# Patient Record
Sex: FEMALE | Race: ASIAN | Employment: FULL TIME | URBAN - METROPOLITAN AREA
[De-identification: names, ages, dates, MRNs, and addresses within clinical notes are randomized per-mention and may not be internally consistent; named-entity substitution may affect disease eponyms.]

---

## 2018-04-05 ENCOUNTER — OFFICE VISIT (OUTPATIENT)
Dept: FAMILY MEDICINE CLINIC | Facility: CLINIC | Age: 38
End: 2018-04-05

## 2018-04-05 VITALS
HEART RATE: 54 BPM | HEIGHT: 65 IN | SYSTOLIC BLOOD PRESSURE: 121 MMHG | DIASTOLIC BLOOD PRESSURE: 71 MMHG | BODY MASS INDEX: 25.49 KG/M2 | TEMPERATURE: 98 F | WEIGHT: 153 LBS

## 2018-04-05 DIAGNOSIS — Z00.00 WELL ADULT EXAM: Primary | ICD-10-CM

## 2018-04-05 PROCEDURE — 99385 PREV VISIT NEW AGE 18-39: CPT | Performed by: FAMILY MEDICINE

## 2018-04-05 NOTE — PROGRESS NOTES
HPI:   Jean-Paul Li is a 40year old female who presents for a complete physical exam. Symptoms: periods are regular. Wt Readings from Last 6 Encounters:  04/05/18 : 153 lb (69.4 kg)    Body mass index is 25.46 kg/m².      No results found for: Janeth Ba developed, well nourished,in no apparent distress  SKIN: no rashes,no suspicious lesions  HEENT: atraumatic, normocephalic,ears and throat are clear  EYES:PERRLA, EOMI,conjunctiva are clear  NECK: supple,no adenopathy,no bruits  CHEST: no chest tenderness

## 2018-07-21 ENCOUNTER — LAB ENCOUNTER (OUTPATIENT)
Dept: LAB | Age: 38
End: 2018-07-21
Attending: FAMILY MEDICINE
Payer: COMMERCIAL

## 2018-07-21 DIAGNOSIS — Z00.00 WELL ADULT EXAM: ICD-10-CM

## 2018-07-21 LAB
ALT SERPL-CCNC: 25 U/L (ref 14–54)
ANION GAP SERPL CALC-SCNC: 8 MMOL/L (ref 0–18)
AST SERPL-CCNC: 28 U/L (ref 15–41)
BASOPHILS # BLD: 0 K/UL (ref 0–0.2)
BASOPHILS NFR BLD: 1 %
BUN SERPL-MCNC: 6 MG/DL (ref 8–20)
BUN/CREAT SERPL: 8.2 (ref 10–20)
CALCIUM SERPL-MCNC: 9.3 MG/DL (ref 8.5–10.5)
CHLORIDE SERPL-SCNC: 105 MMOL/L (ref 95–110)
CHOLEST SERPL-MCNC: 167 MG/DL (ref 110–200)
CO2 SERPL-SCNC: 24 MMOL/L (ref 22–32)
CREAT SERPL-MCNC: 0.73 MG/DL (ref 0.5–1.5)
EOSINOPHIL # BLD: 0.1 K/UL (ref 0–0.7)
EOSINOPHIL NFR BLD: 1 %
ERYTHROCYTE [DISTWIDTH] IN BLOOD BY AUTOMATED COUNT: 14.4 % (ref 11–15)
GLUCOSE SERPL-MCNC: 91 MG/DL (ref 70–99)
HCT VFR BLD AUTO: 40.5 % (ref 35–48)
HDLC SERPL-MCNC: 47 MG/DL
HGB BLD-MCNC: 13.3 G/DL (ref 12–16)
LDLC SERPL CALC-MCNC: 103 MG/DL (ref 0–99)
LYMPHOCYTES # BLD: 1.4 K/UL (ref 1–4)
LYMPHOCYTES NFR BLD: 24 %
MCH RBC QN AUTO: 26.2 PG (ref 27–32)
MCHC RBC AUTO-ENTMCNC: 32.9 G/DL (ref 32–37)
MCV RBC AUTO: 79.7 FL (ref 80–100)
MONOCYTES # BLD: 0.4 K/UL (ref 0–1)
MONOCYTES NFR BLD: 6 %
NEUTROPHILS # BLD AUTO: 3.8 K/UL (ref 1.8–7.7)
NEUTROPHILS NFR BLD: 67 %
NONHDLC SERPL-MCNC: 120 MG/DL
OSMOLALITY UR CALC.SUM OF ELEC: 281 MOSM/KG (ref 275–295)
PLATELET # BLD AUTO: 214 K/UL (ref 140–400)
PMV BLD AUTO: 9.6 FL (ref 7.4–10.3)
POTASSIUM SERPL-SCNC: 4.2 MMOL/L (ref 3.3–5.1)
RBC # BLD AUTO: 5.09 M/UL (ref 3.7–5.4)
SODIUM SERPL-SCNC: 137 MMOL/L (ref 136–144)
TRIGL SERPL-MCNC: 83 MG/DL (ref 1–149)
TSH SERPL-ACNC: 1.35 UIU/ML (ref 0.45–5.33)
WBC # BLD AUTO: 5.6 K/UL (ref 4–11)

## 2018-07-21 PROCEDURE — 85025 COMPLETE CBC W/AUTO DIFF WBC: CPT

## 2018-07-21 PROCEDURE — 36415 COLL VENOUS BLD VENIPUNCTURE: CPT

## 2018-07-21 PROCEDURE — 80048 BASIC METABOLIC PNL TOTAL CA: CPT

## 2018-07-21 PROCEDURE — 82306 VITAMIN D 25 HYDROXY: CPT

## 2018-07-21 PROCEDURE — 80061 LIPID PANEL: CPT

## 2018-07-21 PROCEDURE — 84450 TRANSFERASE (AST) (SGOT): CPT

## 2018-07-21 PROCEDURE — 84443 ASSAY THYROID STIM HORMONE: CPT

## 2018-07-21 PROCEDURE — 84460 ALANINE AMINO (ALT) (SGPT): CPT

## 2018-07-23 ENCOUNTER — TELEPHONE (OUTPATIENT)
Dept: FAMILY MEDICINE CLINIC | Facility: CLINIC | Age: 38
End: 2018-07-23

## 2018-07-23 LAB — 25(OH)D3 SERPL-MCNC: 24.9 NG/ML

## 2018-07-24 NOTE — TELEPHONE ENCOUNTER
Message routed to provider to confirm the disposition of the lab results dated 7/21/18.      Please reply to pool: ALYX RN Jose Alfredo Du

## 2018-07-25 NOTE — TELEPHONE ENCOUNTER
Written by Theodore Levy MD on 7/24/2018 12:46 PM   Labs all good other than Vitamin D level low, Recommend OTC Vitamin D3 supplementation 1000- 2000 IU daily. Results released through My Chart.

## 2018-08-22 ENCOUNTER — TELEPHONE (OUTPATIENT)
Dept: FAMILY MEDICINE CLINIC | Facility: CLINIC | Age: 38
End: 2018-08-22

## 2018-08-22 NOTE — TELEPHONE ENCOUNTER
Most people tend to be deficient with vitamin D. It is okay to take the vitamin D regularly and this can also be checked in the yearly blood work if needed.

## 2018-08-22 NOTE — TELEPHONE ENCOUNTER
Pt asking if she should continue to take Vitamin D? Pt asking if another lab is needed ? Please advise.

## 2018-08-22 NOTE — TELEPHONE ENCOUNTER
AMA please see pt message below. Looks like it is in regards to this result note copied here. Do you just want us to specify to pt that that dose is meant to be taken for life, and you will just check vitamin D level yearly?     Viewed by Telma Calix on

## 2018-10-18 ENCOUNTER — TELEPHONE (OUTPATIENT)
Dept: FAMILY MEDICINE CLINIC | Facility: CLINIC | Age: 38
End: 2018-10-18

## 2018-10-18 NOTE — TELEPHONE ENCOUNTER
Patient states someone called her to set up flu vaccine appt. I had her on hold and accidentally disconnected call. CSS: please call her back and schedule flu vaccine for a Saturday RN slot.

## 2018-10-18 NOTE — TELEPHONE ENCOUNTER
Called pt offered Saturday appt but per pt it's too early for her, and she can not come in [address], told her to call her insurance and see if she can go to other facilities and she understood.

## 2018-11-08 ENCOUNTER — APPOINTMENT (OUTPATIENT)
Dept: LAB | Age: 38
End: 2018-11-08
Attending: FAMILY MEDICINE
Payer: COMMERCIAL

## 2018-11-08 DIAGNOSIS — E55.9 VITAMIN D DEFICIENCY: ICD-10-CM

## 2018-11-08 DIAGNOSIS — R71.8 MICROCYTOSIS: ICD-10-CM

## 2018-11-08 PROCEDURE — 36415 COLL VENOUS BLD VENIPUNCTURE: CPT

## 2018-11-08 PROCEDURE — 84466 ASSAY OF TRANSFERRIN: CPT

## 2018-11-08 PROCEDURE — 83540 ASSAY OF IRON: CPT

## 2018-11-08 PROCEDURE — 82306 VITAMIN D 25 HYDROXY: CPT

## 2018-11-10 RX ORDER — MELATONIN
325 2 TIMES DAILY WITH MEALS
Qty: 180 TABLET | Refills: 1 | Status: SHIPPED | OUTPATIENT
Start: 2018-11-10 | End: 2019-03-22

## 2019-03-18 ENCOUNTER — PATIENT MESSAGE (OUTPATIENT)
Dept: FAMILY MEDICINE CLINIC | Facility: CLINIC | Age: 39
End: 2019-03-18

## 2019-03-18 DIAGNOSIS — E61.1 IRON DEFICIENCY: Primary | ICD-10-CM

## 2019-03-19 NOTE — TELEPHONE ENCOUNTER
From: Anival Leger  To: Ramonita Davidson MD  Sent: 3/18/2019 3:29 PM CDT  Subject: Prescription Question    Hello,    I have completed my Iron supplements today, which was 3 months course and do I have to get the test done again ?  or what?     And I'm still

## 2019-03-21 ENCOUNTER — LAB ENCOUNTER (OUTPATIENT)
Dept: LAB | Age: 39
End: 2019-03-21
Attending: FAMILY MEDICINE
Payer: COMMERCIAL

## 2019-03-21 DIAGNOSIS — E61.1 IRON DEFICIENCY: ICD-10-CM

## 2019-03-21 LAB
BASOPHILS # BLD AUTO: 0.05 X10(3) UL (ref 0–0.2)
BASOPHILS NFR BLD AUTO: 0.7 %
DEPRECATED RDW RBC AUTO: 41.3 FL (ref 35.1–46.3)
EOSINOPHIL # BLD AUTO: 0.12 X10(3) UL (ref 0–0.7)
EOSINOPHIL NFR BLD AUTO: 1.7 %
ERYTHROCYTE [DISTWIDTH] IN BLOOD BY AUTOMATED COUNT: 12.6 % (ref 11–15)
HCT VFR BLD AUTO: 38.7 % (ref 35–48)
HGB BLD-MCNC: 12.3 G/DL (ref 12–16)
IMM GRANULOCYTES # BLD AUTO: 0.02 X10(3) UL (ref 0–1)
IMM GRANULOCYTES NFR BLD: 0.3 %
IRON SATURATION: 16 % (ref 15–50)
IRON SERPL-MCNC: 71 UG/DL (ref 50–170)
LYMPHOCYTES # BLD AUTO: 1.73 X10(3) UL (ref 1–4)
LYMPHOCYTES NFR BLD AUTO: 24.9 %
MCH RBC QN AUTO: 28.3 PG (ref 26–34)
MCHC RBC AUTO-ENTMCNC: 31.8 G/DL (ref 31–37)
MCV RBC AUTO: 89 FL (ref 80–100)
MONOCYTES # BLD AUTO: 0.48 X10(3) UL (ref 0.1–1)
MONOCYTES NFR BLD AUTO: 6.9 %
NEUTROPHILS # BLD AUTO: 4.54 X10 (3) UL (ref 1.5–7.7)
NEUTROPHILS # BLD AUTO: 4.54 X10(3) UL (ref 1.5–7.7)
NEUTROPHILS NFR BLD AUTO: 65.5 %
PLATELET # BLD AUTO: 244 10(3)UL (ref 150–450)
RBC # BLD AUTO: 4.35 X10(6)UL (ref 3.8–5.3)
TOTAL IRON BINDING CAPACITY: 451 UG/DL (ref 240–450)
TRANSFERRIN SERPL-MCNC: 303 MG/DL (ref 200–360)
WBC # BLD AUTO: 6.9 X10(3) UL (ref 4–11)

## 2019-03-21 PROCEDURE — 85025 COMPLETE CBC W/AUTO DIFF WBC: CPT

## 2019-03-21 PROCEDURE — 84466 ASSAY OF TRANSFERRIN: CPT

## 2019-03-21 PROCEDURE — 36415 COLL VENOUS BLD VENIPUNCTURE: CPT

## 2019-03-21 PROCEDURE — 83540 ASSAY OF IRON: CPT

## 2019-03-22 RX ORDER — MELATONIN
325
Qty: 90 TABLET | Refills: 3 | Status: SHIPPED | OUTPATIENT
Start: 2019-03-22 | End: 2020-05-29

## 2019-04-16 ENCOUNTER — PATIENT MESSAGE (OUTPATIENT)
Dept: FAMILY MEDICINE CLINIC | Facility: CLINIC | Age: 39
End: 2019-04-16

## 2019-04-16 NOTE — TELEPHONE ENCOUNTER
From: Manisha Sanchez  To: Jana Davies MD  Sent: 4/16/2019 10:08 AM CDT  Subject: Non-Urgent Medical Question    Thank you Doctor!

## 2019-05-15 ENCOUNTER — OFFICE VISIT (OUTPATIENT)
Dept: FAMILY MEDICINE CLINIC | Facility: CLINIC | Age: 39
End: 2019-05-15
Payer: COMMERCIAL

## 2019-05-15 VITALS
WEIGHT: 169 LBS | SYSTOLIC BLOOD PRESSURE: 117 MMHG | HEIGHT: 65 IN | HEART RATE: 73 BPM | DIASTOLIC BLOOD PRESSURE: 75 MMHG | BODY MASS INDEX: 28.16 KG/M2 | TEMPERATURE: 98 F

## 2019-05-15 DIAGNOSIS — Z00.00 WELL ADULT EXAM: Primary | ICD-10-CM

## 2019-05-15 DIAGNOSIS — E61.1 IRON DEFICIENCY: ICD-10-CM

## 2019-05-15 PROCEDURE — 99395 PREV VISIT EST AGE 18-39: CPT | Performed by: FAMILY MEDICINE

## 2019-05-15 NOTE — PROGRESS NOTES
HPI:    Patient ID: Gia Rodriguez is a 41year old female. HPI  Patient presents with: Well Adult    Thinks tdap around 2014  Has 1 daughter, 8 yrs.     Single mother      Review of Systems   Constitutional: Negative for activity change, appetite restrepo 2010    had her baby     Social History    Socioeconomic History      Marital status:       Spouse name: Not on file      Number of children: Not on file      Years of education: Not on file      Highest education level: Not on file    Occupationa (VITAMIN D) 1000 units Oral Tab Take by mouth.  Disp:  Rfl:    ferrous sulfate 325 (65 FE) MG Oral Tab EC Take 1 tablet (325 mg total) by mouth daily with breakfast. Disp: 90 tablet Rfl: 3     Allergies:No Known Allergies   PHYSICAL EXAM:   Patient presents PLATELET; Future  - LIPID PANEL; Future  - TSH W REFLEX TO FREE T4; Future  - VITAMIN B12; Future    2. Iron deficiency    - IRON AND TIBC;  Future      Orders Placed This Encounter      ALT(SGPT) [E]      AST (SGOT) [E]      Basic Metabolic Panel (8) [E]

## 2019-06-08 ENCOUNTER — LAB ENCOUNTER (OUTPATIENT)
Dept: LAB | Age: 39
End: 2019-06-08
Attending: FAMILY MEDICINE
Payer: COMMERCIAL

## 2019-06-08 DIAGNOSIS — E61.1 IRON DEFICIENCY: ICD-10-CM

## 2019-06-08 DIAGNOSIS — Z00.00 WELL ADULT EXAM: ICD-10-CM

## 2019-06-08 PROCEDURE — 80061 LIPID PANEL: CPT

## 2019-06-08 PROCEDURE — 85025 COMPLETE CBC W/AUTO DIFF WBC: CPT

## 2019-06-08 PROCEDURE — 80048 BASIC METABOLIC PNL TOTAL CA: CPT

## 2019-06-08 PROCEDURE — 84443 ASSAY THYROID STIM HORMONE: CPT

## 2019-06-08 PROCEDURE — 84450 TRANSFERASE (AST) (SGOT): CPT

## 2019-06-08 PROCEDURE — 82607 VITAMIN B-12: CPT

## 2019-06-08 PROCEDURE — 84460 ALANINE AMINO (ALT) (SGPT): CPT

## 2019-06-08 PROCEDURE — 84466 ASSAY OF TRANSFERRIN: CPT

## 2019-06-08 PROCEDURE — 83540 ASSAY OF IRON: CPT

## 2019-06-08 PROCEDURE — 36415 COLL VENOUS BLD VENIPUNCTURE: CPT

## 2019-09-23 ENCOUNTER — TELEPHONE (OUTPATIENT)
Dept: FAMILY MEDICINE CLINIC | Facility: CLINIC | Age: 39
End: 2019-09-23

## 2019-09-26 ENCOUNTER — PATIENT MESSAGE (OUTPATIENT)
Dept: FAMILY MEDICINE CLINIC | Facility: CLINIC | Age: 39
End: 2019-09-26

## 2019-09-26 NOTE — TELEPHONE ENCOUNTER
Message sent. From: Gina Kitchen  To: Marilu Apodaca MD  Sent: 9/26/2019  9:35 AM CDT  Subject: Referral Request    Hello,    I have called Doctors office and requested a referral for Gynecologist by my Doctor, haven't heard anything.     Regards,

## 2019-09-27 ENCOUNTER — PATIENT MESSAGE (OUTPATIENT)
Dept: FAMILY MEDICINE CLINIC | Facility: CLINIC | Age: 39
End: 2019-09-27

## 2019-09-27 DIAGNOSIS — Z01.419 ENCOUNTER FOR WELL WOMAN EXAM WITH ROUTINE GYNECOLOGICAL EXAM: Primary | ICD-10-CM

## 2019-09-27 NOTE — TELEPHONE ENCOUNTER
From: Elfego Austin  To: Delroy Ge MD  Sent: 9/27/2019 10:20 AM CDT  Subject: Referral Request    This will be my first time to see Gynecologist since I got my insurance, just routine checkup.

## 2019-10-16 ENCOUNTER — PATIENT MESSAGE (OUTPATIENT)
Dept: FAMILY MEDICINE CLINIC | Facility: CLINIC | Age: 39
End: 2019-10-16

## 2019-10-16 NOTE — TELEPHONE ENCOUNTER
From: Harsha Benitze  To: Mackenzie Akbar MD  Sent: 10/16/2019 3:40 PM CDT  Subject: Non-Urgent Medical Question    Thank you Doctor!

## 2019-12-21 ENCOUNTER — OFFICE VISIT (OUTPATIENT)
Dept: OBGYN CLINIC | Facility: CLINIC | Age: 39
End: 2019-12-21
Payer: COMMERCIAL

## 2019-12-21 VITALS
DIASTOLIC BLOOD PRESSURE: 74 MMHG | HEART RATE: 64 BPM | HEIGHT: 64.7 IN | WEIGHT: 167 LBS | SYSTOLIC BLOOD PRESSURE: 123 MMHG | BODY MASS INDEX: 28.16 KG/M2

## 2019-12-21 DIAGNOSIS — Z01.419 ENCOUNTER FOR GYNECOLOGICAL EXAMINATION WITHOUT ABNORMAL FINDING: Primary | ICD-10-CM

## 2019-12-21 DIAGNOSIS — N94.3 PMS (PREMENSTRUAL SYNDROME): ICD-10-CM

## 2019-12-21 PROCEDURE — 99212 OFFICE O/P EST SF 10 MIN: CPT | Performed by: OBSTETRICS & GYNECOLOGY

## 2019-12-21 PROCEDURE — 99385 PREV VISIT NEW AGE 18-39: CPT | Performed by: OBSTETRICS & GYNECOLOGY

## 2019-12-21 NOTE — PROGRESS NOTES
Ward Gonzalez is a 46year old female [id number] Patient's last menstrual period was 11/30/2019. Patient presents with:  Gyn Exam: ANNUAL EXAM  Her cycles are  regular. She c/o mood swings and fatigue with menses.   We discussed tx options including calcium file        Attends Holiness service: Not on file        Active member of club or organization: Not on file        Attends meetings of clubs or organizations: Not on file        Relationship status: Not on file      Intimate partner violence:        Fear developed, well nourished  Head/Face: normocephalic  Neck/Thyroid: thyroid symmetric, no thyromegaly, no nodules, no adenopathy  Lymphatic:no abnormal supraclavicular or axillary adenopathy is noted  Breast: normal without palpable masses, tenderness, asym

## 2020-02-14 ENCOUNTER — PATIENT MESSAGE (OUTPATIENT)
Dept: OBGYN CLINIC | Facility: CLINIC | Age: 40
End: 2020-02-14

## 2020-02-14 DIAGNOSIS — Z12.31 SCREENING MAMMOGRAM, ENCOUNTER FOR: Primary | ICD-10-CM

## 2020-02-14 NOTE — TELEPHONE ENCOUNTER
46year old wanting an order for a mammogram.  Pt denies any breast problems or lumps in breasts. She states that she had one five years ago for routine in [facility]. Sent to [facility] for recs.

## 2020-02-14 NOTE — TELEPHONE ENCOUNTER
From: Manisha Sanchez  To: Mily Youngblood. MD Maarh  Sent: 2/14/2020 12:45 PM CST  Subject: Non-Urgent Medical Question    I Want to make an appointment for Mammogram.  Please send me the availabilities.     Thank you

## 2020-02-16 NOTE — TELEPHONE ENCOUNTER
Ok to order a bilateral screening mammogram for her but please advise her that her insurance may or may not cover it since she is not yet 41 and has no risk factors that I am aware of.

## 2020-05-20 ENCOUNTER — NURSE TRIAGE (OUTPATIENT)
Dept: FAMILY MEDICINE CLINIC | Facility: CLINIC | Age: 40
End: 2020-05-20

## 2020-05-20 NOTE — TELEPHONE ENCOUNTER
----- Message from Alcon Augustine RN sent at 5/20/2020 12:39 PM CDT -----  Regarding: FW: Non-Urgent Medical Question  Contact: [phone]      ----- Message -----  From: Gia Rodriguez  Sent: 5/20/2020  10:08 AM CDT  To: Em Rn Triage  Subject: Non-Urgent

## 2020-05-29 RX ORDER — LIDOCAINE HYDROCHLORIDE 20 MG/ML
SOLUTION ORAL; TOPICAL
Qty: 90 TABLET | Refills: 0 | Status: SHIPPED | OUTPATIENT
Start: 2020-05-29 | End: 2020-12-16

## 2020-07-18 ENCOUNTER — OFFICE VISIT (OUTPATIENT)
Dept: FAMILY MEDICINE CLINIC | Facility: CLINIC | Age: 40
End: 2020-07-18
Payer: COMMERCIAL

## 2020-07-18 VITALS
HEIGHT: 64.7 IN | WEIGHT: 167 LBS | DIASTOLIC BLOOD PRESSURE: 75 MMHG | HEART RATE: 59 BPM | SYSTOLIC BLOOD PRESSURE: 113 MMHG | BODY MASS INDEX: 28.16 KG/M2 | TEMPERATURE: 99 F

## 2020-07-18 DIAGNOSIS — N94.3 PMS (PREMENSTRUAL SYNDROME): ICD-10-CM

## 2020-07-18 DIAGNOSIS — Z00.00 WELL ADULT EXAM: Primary | ICD-10-CM

## 2020-07-18 DIAGNOSIS — Z86.39 HISTORY OF IRON DEFICIENCY: ICD-10-CM

## 2020-07-18 DIAGNOSIS — E66.3 OVERWEIGHT (BMI 25.0-29.9): ICD-10-CM

## 2020-07-18 PROCEDURE — 3008F BODY MASS INDEX DOCD: CPT | Performed by: FAMILY MEDICINE

## 2020-07-18 PROCEDURE — 3074F SYST BP LT 130 MM HG: CPT | Performed by: FAMILY MEDICINE

## 2020-07-18 PROCEDURE — 3078F DIAST BP <80 MM HG: CPT | Performed by: FAMILY MEDICINE

## 2020-07-18 PROCEDURE — 99395 PREV VISIT EST AGE 18-39: CPT | Performed by: FAMILY MEDICINE

## 2020-07-18 NOTE — PROGRESS NOTES
HPI:    Patient ID: Graham Cárdenas is a 46year old female. HPI  Patient presents with:   Well Adult: sees gyne     Single mother  Has a 8 yrs old daughter     Review of Systems   Constitutional: Negative for activity change, appetite change, chills, fa had her baby     Social History    Socioeconomic History      Marital status:       Spouse name: Not on file      Number of children: Not on file      Years of education: Not on file      Highest education level: Not on file    Occupational Hist history on file for this patient.     Tobacco Cessation Counseling 2 Years due on 08/03/1993  Influenza Vaccine(1) due on 09/01/2020  Annual Physical due on 12/21/2020  Annual Depression Screen due on 07/18/2021  Pap Smear,3 Years due on 12/21/2022       Cu (bmi 25.0-29. 9)  History of iron deficiency    1.  Well adult exam  Return yearly for physicals  Follow up with dentist every 6 months  Follow up with eye doctor yearly  Exercise for 30mins most days of the week  Yearly flu shot  Tetanus booster every 10 ye

## 2020-07-20 ENCOUNTER — PATIENT MESSAGE (OUTPATIENT)
Dept: FAMILY MEDICINE CLINIC | Facility: CLINIC | Age: 40
End: 2020-07-20

## 2020-07-20 NOTE — TELEPHONE ENCOUNTER
From: Tejas Parada  To: Catie Byrnes MD  Sent: 7/20/2020 2:28 PM CDT  Subject: Non-Urgent Medical Question    Hello Doctor,    Just want to make sure Thyroid test is also included in my routine blood work which I have scheduled for coming Saturday at [facility]

## 2020-07-25 ENCOUNTER — LAB ENCOUNTER (OUTPATIENT)
Dept: LAB | Age: 40
End: 2020-07-25
Attending: FAMILY MEDICINE
Payer: COMMERCIAL

## 2020-07-25 DIAGNOSIS — Z86.39 HISTORY OF IRON DEFICIENCY: ICD-10-CM

## 2020-07-25 DIAGNOSIS — Z00.00 WELL ADULT EXAM: ICD-10-CM

## 2020-07-25 LAB
ALBUMIN SERPL-MCNC: 3.9 G/DL (ref 3.4–5)
ALBUMIN/GLOB SERPL: 1 {RATIO} (ref 1–2)
ALP LIVER SERPL-CCNC: 51 U/L (ref 37–98)
ALT SERPL-CCNC: 26 U/L (ref 13–56)
ANION GAP SERPL CALC-SCNC: 7 MMOL/L (ref 0–18)
AST SERPL-CCNC: 13 U/L (ref 15–37)
BASOPHILS # BLD AUTO: 0.05 X10(3) UL (ref 0–0.2)
BASOPHILS NFR BLD AUTO: 0.8 %
BILIRUB SERPL-MCNC: 0.4 MG/DL (ref 0.1–2)
BUN BLD-MCNC: 11 MG/DL (ref 7–18)
BUN/CREAT SERPL: 13.3 (ref 10–20)
CALCIUM BLD-MCNC: 9.2 MG/DL (ref 8.5–10.1)
CHLORIDE SERPL-SCNC: 106 MMOL/L (ref 98–112)
CHOLEST SMN-MCNC: 175 MG/DL (ref ?–200)
CO2 SERPL-SCNC: 27 MMOL/L (ref 21–32)
CREAT BLD-MCNC: 0.83 MG/DL (ref 0.55–1.02)
DEPRECATED RDW RBC AUTO: 38.9 FL (ref 35.1–46.3)
EOSINOPHIL # BLD AUTO: 0.09 X10(3) UL (ref 0–0.7)
EOSINOPHIL NFR BLD AUTO: 1.4 %
ERYTHROCYTE [DISTWIDTH] IN BLOOD BY AUTOMATED COUNT: 12.4 % (ref 11–15)
GLOBULIN PLAS-MCNC: 3.9 G/DL (ref 2.8–4.4)
GLUCOSE BLD-MCNC: 92 MG/DL (ref 70–99)
HCT VFR BLD AUTO: 42.2 % (ref 35–48)
HDLC SERPL-MCNC: 51 MG/DL (ref 40–59)
HGB BLD-MCNC: 13.7 G/DL (ref 12–16)
IMM GRANULOCYTES # BLD AUTO: 0.01 X10(3) UL (ref 0–1)
IMM GRANULOCYTES NFR BLD: 0.2 %
IRON SATURATION: 15 % (ref 15–50)
IRON SERPL-MCNC: 74 UG/DL (ref 50–170)
LDLC SERPL CALC-MCNC: 105 MG/DL (ref ?–100)
LYMPHOCYTES # BLD AUTO: 1.57 X10(3) UL (ref 1–4)
LYMPHOCYTES NFR BLD AUTO: 23.7 %
M PROTEIN MFR SERPL ELPH: 7.8 G/DL (ref 6.4–8.2)
MCH RBC QN AUTO: 28 PG (ref 26–34)
MCHC RBC AUTO-ENTMCNC: 32.5 G/DL (ref 31–37)
MCV RBC AUTO: 86.1 FL (ref 80–100)
MONOCYTES # BLD AUTO: 0.37 X10(3) UL (ref 0.1–1)
MONOCYTES NFR BLD AUTO: 5.6 %
NEUTROPHILS # BLD AUTO: 4.53 X10 (3) UL (ref 1.5–7.7)
NEUTROPHILS # BLD AUTO: 4.53 X10(3) UL (ref 1.5–7.7)
NEUTROPHILS NFR BLD AUTO: 68.3 %
NONHDLC SERPL-MCNC: 124 MG/DL (ref ?–130)
OSMOLALITY SERPL CALC.SUM OF ELEC: 289 MOSM/KG (ref 275–295)
PATIENT FASTING Y/N/NP: YES
PATIENT FASTING Y/N/NP: YES
PLATELET # BLD AUTO: 243 10(3)UL (ref 150–450)
POTASSIUM SERPL-SCNC: 4.1 MMOL/L (ref 3.5–5.1)
RBC # BLD AUTO: 4.9 X10(6)UL (ref 3.8–5.3)
SODIUM SERPL-SCNC: 140 MMOL/L (ref 136–145)
TOTAL IRON BINDING CAPACITY: 478 UG/DL (ref 240–450)
TRANSFERRIN SERPL-MCNC: 321 MG/DL (ref 200–360)
TRIGL SERPL-MCNC: 93 MG/DL (ref 30–149)
TSI SER-ACNC: 1.51 MIU/ML (ref 0.36–3.74)
VLDLC SERPL CALC-MCNC: 19 MG/DL (ref 0–30)
WBC # BLD AUTO: 6.6 X10(3) UL (ref 4–11)

## 2020-07-25 PROCEDURE — 84443 ASSAY THYROID STIM HORMONE: CPT

## 2020-07-25 PROCEDURE — 83540 ASSAY OF IRON: CPT

## 2020-07-25 PROCEDURE — 80061 LIPID PANEL: CPT

## 2020-07-25 PROCEDURE — 85025 COMPLETE CBC W/AUTO DIFF WBC: CPT

## 2020-07-25 PROCEDURE — 84466 ASSAY OF TRANSFERRIN: CPT

## 2020-07-25 PROCEDURE — 36415 COLL VENOUS BLD VENIPUNCTURE: CPT

## 2020-07-25 PROCEDURE — 80053 COMPREHEN METABOLIC PANEL: CPT

## 2020-09-15 ENCOUNTER — NURSE TRIAGE (OUTPATIENT)
Dept: FAMILY MEDICINE CLINIC | Facility: CLINIC | Age: 40
End: 2020-09-15

## 2020-09-15 NOTE — TELEPHONE ENCOUNTER
Kenny Carranza   9/15/2020   Patient Message   MRN:  [id number]   Description: 45year old female Provider: Ana Cavanaugh MD Department: [facility]   Patient Message  Open      9/15/2020  [facility], [facility], [address] [facility] Danielle Aguilar [place], [place]

## 2020-09-15 NOTE — TELEPHONE ENCOUNTER
Relayed Dr Harmony Montemayor orders with patient who verbalized understanding and agreed with MD plan.

## 2020-09-15 NOTE — TELEPHONE ENCOUNTER
See mychart message below. Awaiting patient call back to triage further.    Please reply to pool: ALYX Pan

## 2020-09-15 NOTE — TELEPHONE ENCOUNTER
Monitor symptoms  Push fluids  Cranberry juice  Ibuprofen  Void after sex  Wipe from front to back  No feminine products  Return with concerns

## 2020-09-15 NOTE — TELEPHONE ENCOUNTER
Action Requested: Summary for Provider     []  Critical Lab, Recommendations Needed  [x] Need Additional Advice  []   FYI    []   Need Orders  [] Need Medications Sent to Pharmacy  []  Other     SUMMARY: Patient is seeking additional advice from the provid

## 2020-12-16 RX ORDER — FERROUS SULFATE 325(65) MG
1 TABLET ORAL
Qty: 90 TABLET | Refills: 0 | Status: SHIPPED | OUTPATIENT
Start: 2020-12-16 | End: 2021-06-05

## 2020-12-16 NOTE — TELEPHONE ENCOUNTER
•  FEROSUL 325 (65 Fe) MG Oral Tab, TAKE 1 TABLET BY MOUTH DAILY WITH BREAKFAST, Disp: 90 tablet, Rfl: 0

## 2021-06-05 RX ORDER — LIDOCAINE HYDROCHLORIDE 20 MG/ML
SOLUTION ORAL; TOPICAL
Qty: 90 TABLET | Refills: 0 | Status: SHIPPED | OUTPATIENT
Start: 2021-06-05 | End: 2021-10-07

## 2021-09-01 ENCOUNTER — OFFICE VISIT (OUTPATIENT)
Dept: FAMILY MEDICINE CLINIC | Facility: CLINIC | Age: 41
End: 2021-09-01
Payer: COMMERCIAL

## 2021-09-01 VITALS
TEMPERATURE: 97 F | WEIGHT: 157 LBS | HEIGHT: 64.7 IN | BODY MASS INDEX: 26.48 KG/M2 | SYSTOLIC BLOOD PRESSURE: 125 MMHG | DIASTOLIC BLOOD PRESSURE: 77 MMHG | HEART RATE: 55 BPM

## 2021-09-01 DIAGNOSIS — E55.9 VITAMIN D DEFICIENCY: ICD-10-CM

## 2021-09-01 DIAGNOSIS — Z00.00 WELL ADULT EXAM: Primary | ICD-10-CM

## 2021-09-01 DIAGNOSIS — E66.3 OVERWEIGHT (BMI 25.0-29.9): ICD-10-CM

## 2021-09-01 DIAGNOSIS — L70.9 ACNE, UNSPECIFIED ACNE TYPE: ICD-10-CM

## 2021-09-01 DIAGNOSIS — Z12.31 ENCOUNTER FOR SCREENING MAMMOGRAM FOR BREAST CANCER: ICD-10-CM

## 2021-09-01 DIAGNOSIS — Z86.39 HISTORY OF IRON DEFICIENCY: ICD-10-CM

## 2021-09-01 PROCEDURE — 99396 PREV VISIT EST AGE 40-64: CPT | Performed by: FAMILY MEDICINE

## 2021-09-01 PROCEDURE — 3008F BODY MASS INDEX DOCD: CPT | Performed by: FAMILY MEDICINE

## 2021-09-01 PROCEDURE — 3074F SYST BP LT 130 MM HG: CPT | Performed by: FAMILY MEDICINE

## 2021-09-01 PROCEDURE — 3078F DIAST BP <80 MM HG: CPT | Performed by: FAMILY MEDICINE

## 2021-09-01 NOTE — PROGRESS NOTES
HPI:    Patient ID: Belle Sandoval is a 37year old female. HPI  Patient presents with:   Well Adult    Wt Readings from Last 6 Encounters:  09/01/21 : 157 lb (71.2 kg)  07/18/20 : 167 lb (75.8 kg)  12/21/19 : 167 lb (75.8 kg)  05/15/19 : 169 lb (76.7 kg °F (36.3 °C) (Temporal)   Ht 5' 4.7\" (1.643 m)   Wt 157 lb (71.2 kg)   LMP 2021   BMI 26.37 kg/m²     History reviewed. No pertinent past medical history.   Past Surgical History:   Procedure Laterality Date   •       had her baby     So Hypertension Neg    • Diabetes Neg    • Heart Disorder Neg    • Breast Cancer Neg    • Ovarian Cancer Neg        Immunization History   Administered Date(s) Administered   • Covid-19 Vaccine [employer] ([employer]) 0.5ml 03/23/2021   • Flucelvax 0.5 Ml Quad PFS Sing Cervical: No cervical adenopathy. Skin:     General: Skin is dry. Findings: Rash present. Comments: Acne on chin area   Neurological:      Mental Status: She is alert and oriented to person, place, and time.       Cranial Nerves: No cranial nerv B12      Vitamin D, 25-Hydroxy      Meds This Visit:  Requested Prescriptions      No prescriptions requested or ordered in this encounter       Imaging & Referrals:  [facility] - INTERNAL       BASSEM#[id number]

## 2021-09-03 ENCOUNTER — PATIENT MESSAGE (OUTPATIENT)
Dept: FAMILY MEDICINE CLINIC | Facility: CLINIC | Age: 41
End: 2021-09-03

## 2021-09-04 NOTE — TELEPHONE ENCOUNTER
From: Harsha Benitez  To: Mackenzie Garcia MD  Sent: 9/3/2021 10:07 AM CDT  Subject: Referral Request    Dr. Eliana Garcia had referred Dr. Justyna Hall. I want to make an appointment with her (online). Please assist me.     Thank you

## 2021-09-04 NOTE — TELEPHONE ENCOUNTER
Next 2 Greatness message with recommendation sent to pt.      Future Appointments   Date Time Provider Kimi Ojeda   10/2/2021 11:00 AM [facility] RM1 [facility]     +

## 2021-09-26 ENCOUNTER — LAB ENCOUNTER (OUTPATIENT)
Dept: LAB | Facility: HOSPITAL | Age: 41
End: 2021-09-26
Attending: FAMILY MEDICINE
Payer: COMMERCIAL

## 2021-09-26 DIAGNOSIS — Z00.00 WELL ADULT EXAM: ICD-10-CM

## 2021-09-26 DIAGNOSIS — Z86.39 HISTORY OF IRON DEFICIENCY: ICD-10-CM

## 2021-09-26 DIAGNOSIS — E55.9 VITAMIN D DEFICIENCY: ICD-10-CM

## 2021-09-26 LAB
ALBUMIN SERPL-MCNC: 3.7 G/DL (ref 3.4–5)
ALBUMIN/GLOB SERPL: 0.9 {RATIO} (ref 1–2)
ALP LIVER SERPL-CCNC: 49 U/L
ALT SERPL-CCNC: 22 U/L
ANION GAP SERPL CALC-SCNC: 2 MMOL/L (ref 0–18)
AST SERPL-CCNC: 13 U/L (ref 15–37)
BASOPHILS # BLD AUTO: 0.05 X10(3) UL (ref 0–0.2)
BASOPHILS NFR BLD AUTO: 0.9 %
BILIRUB SERPL-MCNC: 0.5 MG/DL (ref 0.1–2)
BUN BLD-MCNC: 8 MG/DL (ref 7–18)
BUN/CREAT SERPL: 11.1 (ref 10–20)
CALCIUM BLD-MCNC: 8.6 MG/DL (ref 8.5–10.1)
CHLORIDE SERPL-SCNC: 109 MMOL/L (ref 98–112)
CHOLEST SERPL-MCNC: 171 MG/DL (ref ?–200)
CO2 SERPL-SCNC: 27 MMOL/L (ref 21–32)
CREAT BLD-MCNC: 0.72 MG/DL
DEPRECATED RDW RBC AUTO: 38.4 FL (ref 35.1–46.3)
EOSINOPHIL # BLD AUTO: 0.06 X10(3) UL (ref 0–0.7)
EOSINOPHIL NFR BLD AUTO: 1 %
ERYTHROCYTE [DISTWIDTH] IN BLOOD BY AUTOMATED COUNT: 12.2 % (ref 11–15)
GLOBULIN PLAS-MCNC: 4 G/DL (ref 2.8–4.4)
GLUCOSE BLD-MCNC: 89 MG/DL (ref 70–99)
HCT VFR BLD AUTO: 41.8 %
HDLC SERPL-MCNC: 44 MG/DL (ref 40–59)
HGB BLD-MCNC: 13.5 G/DL
IMM GRANULOCYTES # BLD AUTO: 0.01 X10(3) UL (ref 0–1)
IMM GRANULOCYTES NFR BLD: 0.2 %
IRON SATURATION: 21 %
IRON SERPL-MCNC: 103 UG/DL
LDLC SERPL CALC-MCNC: 111 MG/DL (ref ?–100)
LYMPHOCYTES # BLD AUTO: 1.48 X10(3) UL (ref 1–4)
LYMPHOCYTES NFR BLD AUTO: 25.2 %
MCH RBC QN AUTO: 27.7 PG (ref 26–34)
MCHC RBC AUTO-ENTMCNC: 32.3 G/DL (ref 31–37)
MCV RBC AUTO: 85.7 FL
MONOCYTES # BLD AUTO: 0.31 X10(3) UL (ref 0.1–1)
MONOCYTES NFR BLD AUTO: 5.3 %
NEUTROPHILS # BLD AUTO: 3.97 X10 (3) UL (ref 1.5–7.7)
NEUTROPHILS # BLD AUTO: 3.97 X10(3) UL (ref 1.5–7.7)
NEUTROPHILS NFR BLD AUTO: 67.4 %
NONHDLC SERPL-MCNC: 127 MG/DL (ref ?–130)
OSMOLALITY SERPL CALC.SUM OF ELEC: 284 MOSM/KG (ref 275–295)
PATIENT FASTING Y/N/NP: YES
PATIENT FASTING Y/N/NP: YES
PLATELET # BLD AUTO: 273 10(3)UL (ref 150–450)
POTASSIUM SERPL-SCNC: 4.2 MMOL/L (ref 3.5–5.1)
PROT SERPL-MCNC: 7.7 G/DL (ref 6.4–8.2)
RBC # BLD AUTO: 4.88 X10(6)UL
SODIUM SERPL-SCNC: 138 MMOL/L (ref 136–145)
TOTAL IRON BINDING CAPACITY: 487 UG/DL (ref 240–450)
TRANSFERRIN SERPL-MCNC: 327 MG/DL (ref 200–360)
TRIGL SERPL-MCNC: 88 MG/DL (ref 30–149)
TSI SER-ACNC: 1.43 MIU/ML (ref 0.36–3.74)
VIT B12 SERPL-MCNC: 496 PG/ML (ref 193–986)
VIT D+METAB SERPL-MCNC: 32.3 NG/ML (ref 30–100)
VLDLC SERPL CALC-MCNC: 15 MG/DL (ref 0–30)
WBC # BLD AUTO: 5.9 X10(3) UL (ref 4–11)

## 2021-09-26 PROCEDURE — 80053 COMPREHEN METABOLIC PANEL: CPT

## 2021-09-26 PROCEDURE — 85025 COMPLETE CBC W/AUTO DIFF WBC: CPT

## 2021-09-26 PROCEDURE — 36415 COLL VENOUS BLD VENIPUNCTURE: CPT

## 2021-09-26 PROCEDURE — 80061 LIPID PANEL: CPT

## 2021-09-26 PROCEDURE — 84466 ASSAY OF TRANSFERRIN: CPT

## 2021-09-26 PROCEDURE — 82607 VITAMIN B-12: CPT

## 2021-09-26 PROCEDURE — 82306 VITAMIN D 25 HYDROXY: CPT

## 2021-09-26 PROCEDURE — 84443 ASSAY THYROID STIM HORMONE: CPT

## 2021-09-26 PROCEDURE — 83540 ASSAY OF IRON: CPT

## 2021-10-06 RX ORDER — LIDOCAINE HYDROCHLORIDE 20 MG/ML
SOLUTION ORAL; TOPICAL
Qty: 90 TABLET | Refills: 0 | OUTPATIENT
Start: 2021-10-06

## 2021-10-06 NOTE — TELEPHONE ENCOUNTER
Please review. Protocol failed/ No protocol      Requested Prescriptions   Pending Prescriptions Disp Refills    FEROSUL 325 (65 Fe) MG Oral Tab [Pharmacy Med Name: FERROUS SULFATE 325MG (5GR) TABS] 90 tablet 0     Sig: TAKE 1 TABLET BY MOUTH DAILY WITH BAILEY

## 2021-10-07 RX ORDER — ELECTROLYTES/DEXTROSE
1 SOLUTION, ORAL ORAL DAILY
Qty: 1 TABLET | Refills: 0 | COMMUNITY
Start: 2021-10-07

## 2021-10-07 NOTE — TELEPHONE ENCOUNTER
The patient was called and informed. Medication record update. The patient stated she is taking the multivitamin with iron.

## 2021-11-29 ENCOUNTER — OFFICE VISIT (OUTPATIENT)
Dept: DERMATOLOGY CLINIC | Facility: CLINIC | Age: 41
End: 2021-11-29
Payer: COMMERCIAL

## 2021-11-29 DIAGNOSIS — L70.0 ACNE VULGARIS: Primary | ICD-10-CM

## 2021-11-29 PROCEDURE — 99203 OFFICE O/P NEW LOW 30 MIN: CPT | Performed by: DERMATOLOGY

## 2021-11-29 RX ORDER — CLINDAMYCIN PHOSPHATE 10 MG/ML
1 LOTION TOPICAL 2 TIMES DAILY
Qty: 60 ML | Refills: 5 | Status: SHIPPED | OUTPATIENT
Start: 2021-11-29 | End: 2021-12-29

## 2021-11-29 RX ORDER — TAZAROTENE 1 MG/G
CREAM TOPICAL
Qty: 30 G | Refills: 2 | Status: SHIPPED | OUTPATIENT
Start: 2021-11-29

## 2021-11-29 RX ORDER — AZELAIC ACID 0.15 G/G
GEL TOPICAL
Qty: 50 G | Refills: 3 | Status: SHIPPED | OUTPATIENT
Start: 2021-11-29

## 2021-12-01 ENCOUNTER — TELEPHONE (OUTPATIENT)
Dept: DERMATOLOGY CLINIC | Facility: CLINIC | Age: 41
End: 2021-12-01

## 2021-12-02 NOTE — TELEPHONE ENCOUNTER
Medication PA Requested:       Azelaic Acid (FINACEA) 15% External Gel                                                   CoverMyMeds Used:  Key:  [id number]  Quantity:  60ml  Day Supply:  Sig:   DX Code:                                     CPT code (if appli

## 2021-12-13 NOTE — PROGRESS NOTES
Chuck Sanchez is a 37year old female. Patient presents with:  Acne: NO hx of skin ca. NEW pt presenting for acne. Pt is having breakout in the chin/face area, new since last december. Pt using differin mosturizer, and OTC face wash.   Has tried diffe Socioeconomic History      Marital status:       Spouse name: Not on file      Number of children: Not on file      Years of education: Not on file      Highest education level: Not on file    Occupational History      Not on file    Tobacco Use and discoloration     Mask may be aggravating patient presents with concerns above. ROS:    Denies any other systemic complaints. No fevers, chills, night sweats, sensitivity to the sun, deeper lumps or bumps. No other skin complaints.   History, med Use sparingly not more than pea size amount for the entire face start every 2-3 nights increasing slowly over several weeks to nightly as tolerated. Need for chronic use over several months to see maximum benefit.   Risk of dryness, redness ,peeling irrita (from the past 48 hour(s)). Meds This Visit:      Imaging Orders:  None     Referral Orders:  No orders of the defined types were placed in this encounter.

## 2021-12-16 ENCOUNTER — PATIENT MESSAGE (OUTPATIENT)
Dept: DERMATOLOGY CLINIC | Facility: CLINIC | Age: 41
End: 2021-12-16

## 2021-12-16 NOTE — TELEPHONE ENCOUNTER
LOV 11/21, pt RXed tazorac, finacea and cleocin, please see her msg. Finacea, cleocin to whole face, tazorac to spots?     Please advise

## 2022-01-08 ENCOUNTER — HOSPITAL ENCOUNTER (OUTPATIENT)
Dept: MAMMOGRAPHY | Age: 42
Discharge: HOME | End: 2022-01-08
Attending: FAMILY MEDICINE
Payer: COMMERCIAL

## 2022-01-08 DIAGNOSIS — Z12.31 ENCOUNTER FOR SCREENING MAMMOGRAM FOR BREAST CANCER: ICD-10-CM

## 2022-01-08 PROCEDURE — 77063 BREAST TOMOSYNTHESIS BI: CPT | Performed by: FAMILY MEDICINE

## 2022-01-08 PROCEDURE — 77067 SCR MAMMO BI INCL CAD: CPT | Performed by: FAMILY MEDICINE

## 2022-02-10 ENCOUNTER — OFFICE VISIT (OUTPATIENT)
Dept: OBGYN CLINIC | Facility: CLINIC | Age: 42
End: 2022-02-10
Payer: COMMERCIAL

## 2022-02-10 VITALS
BODY MASS INDEX: 28 KG/M2 | DIASTOLIC BLOOD PRESSURE: 72 MMHG | WEIGHT: 167 LBS | HEART RATE: 63 BPM | SYSTOLIC BLOOD PRESSURE: 107 MMHG

## 2022-02-10 DIAGNOSIS — Z01.419 ENCOUNTER FOR GYNECOLOGICAL EXAMINATION WITHOUT ABNORMAL FINDING: Primary | ICD-10-CM

## 2022-02-10 PROCEDURE — 3074F SYST BP LT 130 MM HG: CPT | Performed by: OBSTETRICS & GYNECOLOGY

## 2022-02-10 PROCEDURE — 99396 PREV VISIT EST AGE 40-64: CPT | Performed by: OBSTETRICS & GYNECOLOGY

## 2022-02-10 PROCEDURE — 3078F DIAST BP <80 MM HG: CPT | Performed by: OBSTETRICS & GYNECOLOGY

## 2022-03-10 ENCOUNTER — PATIENT MESSAGE (OUTPATIENT)
Dept: DERMATOLOGY CLINIC | Facility: CLINIC | Age: 42
End: 2022-03-10

## 2022-03-10 NOTE — TELEPHONE ENCOUNTER
LOV 11/29/21 (Acne) - Please see pt's message below regarding her acne condition update (pt is very grateful) and also her question about bleaching the hair on her face. Thank you.

## 2022-03-11 NOTE — TELEPHONE ENCOUNTER
The facial bleaches are ok, just be cautious as she might have more irritation, perhaps do a small test area first

## 2022-10-15 ENCOUNTER — OFFICE VISIT (OUTPATIENT)
Dept: FAMILY MEDICINE CLINIC | Facility: CLINIC | Age: 42
End: 2022-10-15
Payer: COMMERCIAL

## 2022-10-15 VITALS
WEIGHT: 169 LBS | DIASTOLIC BLOOD PRESSURE: 72 MMHG | HEIGHT: 64.7 IN | BODY MASS INDEX: 28.5 KG/M2 | SYSTOLIC BLOOD PRESSURE: 110 MMHG | TEMPERATURE: 97 F | HEART RATE: 58 BPM

## 2022-10-15 DIAGNOSIS — E66.3 OVERWEIGHT (BMI 25.0-29.9): ICD-10-CM

## 2022-10-15 DIAGNOSIS — Z00.00 WELL ADULT EXAM: Primary | ICD-10-CM

## 2022-10-15 DIAGNOSIS — E55.9 VITAMIN D DEFICIENCY: ICD-10-CM

## 2022-10-15 PROBLEM — N94.3 PMS (PREMENSTRUAL SYNDROME): Status: RESOLVED | Noted: 2019-12-21 | Resolved: 2022-10-15

## 2022-10-15 PROBLEM — Z30.41 ORAL CONTRACEPTIVE USE: Status: ACTIVE | Noted: 2022-10-15

## 2022-10-15 PROBLEM — Z30.41 ORAL CONTRACEPTIVE USE: Status: RESOLVED | Noted: 2022-10-15 | Resolved: 2022-10-15

## 2022-10-15 PROCEDURE — 3074F SYST BP LT 130 MM HG: CPT | Performed by: FAMILY MEDICINE

## 2022-10-15 PROCEDURE — 99396 PREV VISIT EST AGE 40-64: CPT | Performed by: FAMILY MEDICINE

## 2022-10-15 PROCEDURE — 3078F DIAST BP <80 MM HG: CPT | Performed by: FAMILY MEDICINE

## 2022-10-15 PROCEDURE — 3008F BODY MASS INDEX DOCD: CPT | Performed by: FAMILY MEDICINE

## 2022-10-15 RX ORDER — NORETHINDRONE ACETATE/ETHINYL ESTRADIOL AND FERROUS FUMARATE 1MG-20(21)
KIT ORAL
COMMUNITY
Start: 2022-07-07 | End: 2022-10-15

## 2022-10-20 ENCOUNTER — LAB ENCOUNTER (OUTPATIENT)
Dept: LAB | Age: 42
End: 2022-10-20
Attending: FAMILY MEDICINE
Payer: COMMERCIAL

## 2022-10-20 DIAGNOSIS — Z00.00 WELL ADULT EXAM: ICD-10-CM

## 2022-10-20 LAB
ALBUMIN SERPL-MCNC: 3.7 G/DL (ref 3.4–5)
ALBUMIN/GLOB SERPL: 1 {RATIO} (ref 1–2)
ALP LIVER SERPL-CCNC: 50 U/L
ALT SERPL-CCNC: 30 U/L
ANION GAP SERPL CALC-SCNC: 8 MMOL/L (ref 0–18)
AST SERPL-CCNC: 18 U/L (ref 15–37)
BASOPHILS # BLD AUTO: 0.05 X10(3) UL (ref 0–0.2)
BASOPHILS NFR BLD AUTO: 0.9 %
BILIRUB SERPL-MCNC: 0.5 MG/DL (ref 0.1–2)
BUN BLD-MCNC: 8 MG/DL (ref 7–18)
BUN/CREAT SERPL: 10.1 (ref 10–20)
CALCIUM BLD-MCNC: 8.9 MG/DL (ref 8.5–10.1)
CHLORIDE SERPL-SCNC: 106 MMOL/L (ref 98–112)
CHOLEST SERPL-MCNC: 163 MG/DL (ref ?–200)
CO2 SERPL-SCNC: 24 MMOL/L (ref 21–32)
CREAT BLD-MCNC: 0.79 MG/DL
DEPRECATED RDW RBC AUTO: 38.6 FL (ref 35.1–46.3)
EOSINOPHIL # BLD AUTO: 0.11 X10(3) UL (ref 0–0.7)
EOSINOPHIL NFR BLD AUTO: 2 %
ERYTHROCYTE [DISTWIDTH] IN BLOOD BY AUTOMATED COUNT: 12.1 % (ref 11–15)
FASTING PATIENT LIPID ANSWER: YES
FASTING STATUS PATIENT QL REPORTED: YES
GFR SERPLBLD BASED ON 1.73 SQ M-ARVRAT: 96 ML/MIN/1.73M2 (ref 60–?)
GLOBULIN PLAS-MCNC: 3.6 G/DL (ref 2.8–4.4)
GLUCOSE BLD-MCNC: 91 MG/DL (ref 70–99)
HCT VFR BLD AUTO: 42.5 %
HDLC SERPL-MCNC: 41 MG/DL (ref 40–59)
HGB BLD-MCNC: 13.5 G/DL
IMM GRANULOCYTES # BLD AUTO: 0.01 X10(3) UL (ref 0–1)
IMM GRANULOCYTES NFR BLD: 0.2 %
LDLC SERPL CALC-MCNC: 95 MG/DL (ref ?–100)
LYMPHOCYTES # BLD AUTO: 1.65 X10(3) UL (ref 1–4)
LYMPHOCYTES NFR BLD AUTO: 29.4 %
MCH RBC QN AUTO: 27.5 PG (ref 26–34)
MCHC RBC AUTO-ENTMCNC: 31.8 G/DL (ref 31–37)
MCV RBC AUTO: 86.6 FL
MONOCYTES # BLD AUTO: 0.29 X10(3) UL (ref 0.1–1)
MONOCYTES NFR BLD AUTO: 5.2 %
NEUTROPHILS # BLD AUTO: 3.51 X10 (3) UL (ref 1.5–7.7)
NEUTROPHILS # BLD AUTO: 3.51 X10(3) UL (ref 1.5–7.7)
NEUTROPHILS NFR BLD AUTO: 62.3 %
NONHDLC SERPL-MCNC: 122 MG/DL (ref ?–130)
OSMOLALITY SERPL CALC.SUM OF ELEC: 284 MOSM/KG (ref 275–295)
PLATELET # BLD AUTO: 247 10(3)UL (ref 150–450)
POTASSIUM SERPL-SCNC: 4.1 MMOL/L (ref 3.5–5.1)
PROT SERPL-MCNC: 7.3 G/DL (ref 6.4–8.2)
RBC # BLD AUTO: 4.91 X10(6)UL
SODIUM SERPL-SCNC: 138 MMOL/L (ref 136–145)
TRIGL SERPL-MCNC: 156 MG/DL (ref 30–149)
TSI SER-ACNC: 1.58 MIU/ML (ref 0.36–3.74)
VLDLC SERPL CALC-MCNC: 26 MG/DL (ref 0–30)
WBC # BLD AUTO: 5.6 X10(3) UL (ref 4–11)

## 2022-10-20 PROCEDURE — 84443 ASSAY THYROID STIM HORMONE: CPT

## 2022-10-20 PROCEDURE — 80061 LIPID PANEL: CPT

## 2022-10-20 PROCEDURE — 80053 COMPREHEN METABOLIC PANEL: CPT

## 2022-10-20 PROCEDURE — 85025 COMPLETE CBC W/AUTO DIFF WBC: CPT

## 2022-10-20 PROCEDURE — 36415 COLL VENOUS BLD VENIPUNCTURE: CPT

## 2022-10-26 ENCOUNTER — OFFICE VISIT (OUTPATIENT)
Dept: DERMATOLOGY CLINIC | Facility: CLINIC | Age: 42
End: 2022-10-26
Payer: COMMERCIAL

## 2022-10-26 DIAGNOSIS — L70.0 ACNE VULGARIS: Primary | ICD-10-CM

## 2022-10-26 PROCEDURE — 99213 OFFICE O/P EST LOW 20 MIN: CPT | Performed by: DERMATOLOGY

## 2022-11-08 ENCOUNTER — PATIENT MESSAGE (OUTPATIENT)
Dept: DERMATOLOGY CLINIC | Facility: CLINIC | Age: 42
End: 2022-11-08

## 2022-11-10 NOTE — TELEPHONE ENCOUNTER
Priscila Barker presents to the clinic today for management of her anticoagulation therapy in treatment of her atrial fibrillation. Target INR is 2.0-3.0. Her INR today was therapeutic at 2.0 on 18.75 mg of Warfarin weekly. Her previous INR was subtherapeutic at 1.9 on 10/18/21 and her weekly Warfarin dose was increased from 17.5 mg to 18.75 mg. The patient reports medication changes since the last visit, she was prescribed Baclofen for back pain when in Urgent Care on 10/24/21.  Per [facility], Baclofen has no known interaction with Warfarin. She  denies diet changes since the last visit. She was able to ambulate to office without assistive device. Priscila will continue taking 18.75 mg of Warfarin weekly (3.75 mg on Mondays and 2.5 mg the other 6 days of the week) and return to the clinic in 9 days on 11/5/21 for INR fingerstick. Onsite billing provider is Jeffrey Marquez MD.      Yes, apply after cleansing and over moisturizers.  Small amount of the tazorac, generally start every other night x 2 weeks then at bedtime as tolerated

## 2023-01-12 ENCOUNTER — TELEPHONE (OUTPATIENT)
Dept: FAMILY MEDICINE CLINIC | Facility: CLINIC | Age: 43
End: 2023-01-12

## 2023-01-12 DIAGNOSIS — M54.2 NECK PAIN: Primary | ICD-10-CM

## 2023-01-12 NOTE — TELEPHONE ENCOUNTER
Patient is calling and she is requesting to have a referral for a chiropractor.  She said that he is in network she has an appt with him 1/13 @ 9:30am.    Dr. Bridget To  21

## 2023-01-13 NOTE — TELEPHONE ENCOUNTER
Dr. Lynn Lawson,     Patient is requesting a referral to Dr. Saranya Ewing for neck pain. Pended referral please review diagnosis and sign off if you agree. Thank you.   Sean Davila

## 2023-01-13 NOTE — TELEPHONE ENCOUNTER
Dr Reva Hallman, please advise (out of house)  Herve Robert, sent to another provider    Last OV 10/15/22  Patient has not seen PCP for acute neck pain. Patient c/o neck pain for one week  Patient denies any fall or injury. Patient c/o pain after using a \"high pillow\" for sleep. Patient is requesting a chiropractor for referral.  Patient has appt today at 3pm with Chiropractor. Offered FM appt with a provider today but patient declined.    Explained to patient that referral will take 5-7 days for approval.

## 2023-01-17 ENCOUNTER — PATIENT MESSAGE (OUTPATIENT)
Dept: FAMILY MEDICINE CLINIC | Facility: CLINIC | Age: 43
End: 2023-01-17

## 2023-01-17 NOTE — TELEPHONE ENCOUNTER
From: Rogelio Magdaleno  To: Montserrat Hallman MD  Sent: 1/17/2023 10:31 AM CST  Subject: Bryan Martinez Doctor,    Thank you so much for sending the referral to the chiropractor. I want to give you the update on this, and I want to send the X-rays for you to take a look. I'm going for treatment which includes adjustments and massage by the therapist. I can feel the difference so far and have two more to go. Please feel free to add on your advice since you are my physician and I'm very grateful to have you as my Primary care physician. Do I need to see sports medicine, or this will cure me (just a concern). Thank you.   Lucius Fabian

## 2023-05-01 ENCOUNTER — OFFICE VISIT (OUTPATIENT)
Dept: FAMILY MEDICINE CLINIC | Facility: CLINIC | Age: 43
End: 2023-05-01

## 2023-05-01 VITALS
HEIGHT: 64.7 IN | SYSTOLIC BLOOD PRESSURE: 107 MMHG | HEART RATE: 56 BPM | OXYGEN SATURATION: 100 % | DIASTOLIC BLOOD PRESSURE: 70 MMHG | WEIGHT: 171 LBS | BODY MASS INDEX: 28.84 KG/M2

## 2023-05-01 DIAGNOSIS — Z12.31 ENCOUNTER FOR SCREENING MAMMOGRAM FOR BREAST CANCER: ICD-10-CM

## 2023-05-01 DIAGNOSIS — M54.2 NECK PAIN: Primary | ICD-10-CM

## 2023-05-01 DIAGNOSIS — E78.00 MILD HYPERCHOLESTEROLEMIA: ICD-10-CM

## 2023-05-01 PROCEDURE — 3008F BODY MASS INDEX DOCD: CPT | Performed by: FAMILY MEDICINE

## 2023-05-01 PROCEDURE — 3074F SYST BP LT 130 MM HG: CPT | Performed by: FAMILY MEDICINE

## 2023-05-01 PROCEDURE — 99214 OFFICE O/P EST MOD 30 MIN: CPT | Performed by: FAMILY MEDICINE

## 2023-05-01 PROCEDURE — 3078F DIAST BP <80 MM HG: CPT | Performed by: FAMILY MEDICINE

## 2023-06-02 ENCOUNTER — HOSPITAL ENCOUNTER (OUTPATIENT)
Dept: GENERAL RADIOLOGY | Age: 43
Discharge: HOME | End: 2023-06-02
Attending: FAMILY MEDICINE
Payer: COMMERCIAL

## 2023-06-02 DIAGNOSIS — M54.2 NECK PAIN: ICD-10-CM

## 2023-06-02 PROCEDURE — 72050 X-RAY EXAM NECK SPINE 4/5VWS: CPT | Performed by: FAMILY MEDICINE

## 2023-06-08 DIAGNOSIS — M41.82: Primary | ICD-10-CM

## 2023-06-08 DIAGNOSIS — M47.812 CERVICAL SPONDYLOSIS: ICD-10-CM

## 2023-06-14 ENCOUNTER — PATIENT MESSAGE (OUTPATIENT)
Dept: FAMILY MEDICINE CLINIC | Facility: CLINIC | Age: 43
End: 2023-06-14

## 2023-06-14 DIAGNOSIS — R53.83 OTHER FATIGUE: Primary | ICD-10-CM

## 2023-06-14 DIAGNOSIS — R42 DIZZINESS: ICD-10-CM

## 2023-06-15 NOTE — TELEPHONE ENCOUNTER
From: Jodie Delgado  To: Jak Akbar MD  Sent: 6/14/2023 5:50 PM CDT  Subject: Damaso Wang    When I saw My doctor last month and I had this question for getting my blood test done for Vitamin D, Iron and Thyroid since in my last routine check Vitamin D and Iron was not done. Doctor said I don't need to but I mentioned that I kind of feel Fatigue or sleepy so just for my piece of mind until I get my annual physical checkup which is all the way in October. If she can send the order (preauthorization for preventive care) that would be great. Please get back to me on this and I'm even waiting for her to read my results for Cervical X-Rays. Thank you so much.     Natan Flor

## 2023-06-19 ENCOUNTER — TELEPHONE (OUTPATIENT)
Dept: CASE MANAGEMENT | Age: 43
End: 2023-06-19

## 2023-06-26 ENCOUNTER — LAB ENCOUNTER (OUTPATIENT)
Dept: LAB | Age: 43
End: 2023-06-26
Attending: FAMILY MEDICINE
Payer: COMMERCIAL

## 2023-06-26 DIAGNOSIS — R42 DIZZINESS: ICD-10-CM

## 2023-06-26 DIAGNOSIS — R53.83 OTHER FATIGUE: ICD-10-CM

## 2023-06-26 LAB
BASOPHILS # BLD AUTO: 0.05 X10(3) UL (ref 0–0.2)
BASOPHILS NFR BLD AUTO: 0.7 %
EOSINOPHIL # BLD AUTO: 0.12 X10(3) UL (ref 0–0.7)
EOSINOPHIL NFR BLD AUTO: 1.8 %
ERYTHROCYTE [DISTWIDTH] IN BLOOD BY AUTOMATED COUNT: 12.8 %
HCT VFR BLD AUTO: 40.2 %
HGB BLD-MCNC: 12.6 G/DL
IMM GRANULOCYTES # BLD AUTO: 0.02 X10(3) UL (ref 0–1)
IMM GRANULOCYTES NFR BLD: 0.3 %
IRON SATN MFR SERPL: 16 %
IRON SERPL-MCNC: 79 UG/DL
LYMPHOCYTES # BLD AUTO: 1.7 X10(3) UL (ref 1–4)
LYMPHOCYTES NFR BLD AUTO: 25.4 %
MCH RBC QN AUTO: 27.3 PG (ref 26–34)
MCHC RBC AUTO-ENTMCNC: 31.3 G/DL (ref 31–37)
MCV RBC AUTO: 87 FL
MONOCYTES # BLD AUTO: 0.45 X10(3) UL (ref 0.1–1)
MONOCYTES NFR BLD AUTO: 6.7 %
NEUTROPHILS # BLD AUTO: 4.36 X10 (3) UL (ref 1.5–7.7)
NEUTROPHILS # BLD AUTO: 4.36 X10(3) UL (ref 1.5–7.7)
NEUTROPHILS NFR BLD AUTO: 65.1 %
PLATELET # BLD AUTO: 244 10(3)UL (ref 150–450)
RBC # BLD AUTO: 4.62 X10(6)UL
TIBC SERPL-MCNC: 486 UG/DL (ref 240–450)
TRANSFERRIN SERPL-MCNC: 326 MG/DL (ref 200–360)
TSI SER-ACNC: 1.64 MIU/ML (ref 0.36–3.74)
VIT B12 SERPL-MCNC: 509 PG/ML (ref 193–986)
VIT D+METAB SERPL-MCNC: 42.2 NG/ML (ref 30–100)
WBC # BLD AUTO: 6.7 X10(3) UL (ref 4–11)

## 2023-06-26 PROCEDURE — 84443 ASSAY THYROID STIM HORMONE: CPT

## 2023-06-26 PROCEDURE — 85025 COMPLETE CBC W/AUTO DIFF WBC: CPT

## 2023-06-26 PROCEDURE — 82607 VITAMIN B-12: CPT

## 2023-06-26 PROCEDURE — 83550 IRON BINDING TEST: CPT

## 2023-06-26 PROCEDURE — 36415 COLL VENOUS BLD VENIPUNCTURE: CPT

## 2023-06-26 PROCEDURE — 82306 VITAMIN D 25 HYDROXY: CPT

## 2023-06-26 PROCEDURE — 83540 ASSAY OF IRON: CPT

## 2023-07-18 ENCOUNTER — PATIENT MESSAGE (OUTPATIENT)
Dept: DERMATOLOGY CLINIC | Facility: CLINIC | Age: 43
End: 2023-07-18

## 2023-07-21 ENCOUNTER — OFFICE VISIT (OUTPATIENT)
Dept: PHYSICAL MEDICINE AND REHAB | Facility: CLINIC | Age: 43
End: 2023-07-21
Payer: COMMERCIAL

## 2023-07-21 VITALS
HEIGHT: 64.7 IN | WEIGHT: 169 LBS | SYSTOLIC BLOOD PRESSURE: 120 MMHG | DIASTOLIC BLOOD PRESSURE: 78 MMHG | BODY MASS INDEX: 28.5 KG/M2 | TEMPERATURE: 71 F

## 2023-07-21 DIAGNOSIS — M47.812 CERVICAL SPONDYLOSIS: Primary | ICD-10-CM

## 2023-07-21 DIAGNOSIS — M79.18 CERVICAL MYOFASCIAL PAIN SYNDROME: ICD-10-CM

## 2023-07-21 PROCEDURE — 3078F DIAST BP <80 MM HG: CPT | Performed by: PHYSICAL MEDICINE & REHABILITATION

## 2023-07-21 PROCEDURE — 3074F SYST BP LT 130 MM HG: CPT | Performed by: PHYSICAL MEDICINE & REHABILITATION

## 2023-07-21 PROCEDURE — 3008F BODY MASS INDEX DOCD: CPT | Performed by: PHYSICAL MEDICINE & REHABILITATION

## 2023-07-21 PROCEDURE — 99204 OFFICE O/P NEW MOD 45 MIN: CPT | Performed by: PHYSICAL MEDICINE & REHABILITATION

## 2023-07-22 ENCOUNTER — OFFICE VISIT (OUTPATIENT)
Dept: DERMATOLOGY CLINIC | Facility: CLINIC | Age: 43
End: 2023-07-22

## 2023-07-22 DIAGNOSIS — L30.9 DERMATITIS: Primary | ICD-10-CM

## 2023-07-22 PROCEDURE — 99213 OFFICE O/P EST LOW 20 MIN: CPT | Performed by: DERMATOLOGY

## 2023-07-22 RX ORDER — CLOBETASOL PROPIONATE 0.5 MG/G
1 CREAM TOPICAL 2 TIMES DAILY
Qty: 60 G | Refills: 3 | Status: SHIPPED | OUTPATIENT
Start: 2023-07-22 | End: 2024-07-21

## 2023-07-22 RX ORDER — CETIRIZINE HYDROCHLORIDE 10 MG/1
10 TABLET ORAL DAILY
Qty: 30 TABLET | Refills: 0 | Status: SHIPPED | OUTPATIENT
Start: 2023-07-22

## 2023-07-23 NOTE — PROGRESS NOTES
Meryle Hait is a 40year old female. Patient presents with:  Itching  Derm Problem: Established pt, LOV 10/26/22, presents with itching, swelling and dryness to hands, worse at night. Onset 3-4 days ago. Using benadryl and HC with relief            Patient has no known allergies. Current Outpatient Medications   Medication Sig Dispense Refill    clobetasol 0.05 % External Cream Apply 1 Application topically 2 (two) times daily. 60 g 3    cetirizine 10 MG Oral Tab Take 1 tablet (10 mg total) by mouth daily. 30 tablet 0    Multiple Vitamin (MULTIVITAMIN ADULT) Oral Tab Take 1 tablet by mouth daily. With iron 1 tablet 0    Cholecalciferol (VITAMIN D) 1000 units Oral Tab Take by mouth. History reviewed. No pertinent past medical history. Social History:  Social History     Socioeconomic History    Marital status: Single   Tobacco Use    Smoking status: Never    Smokeless tobacco: Never    Tobacco comments:     once monthly, hooka   Substance and Sexual Activity    Alcohol use: Yes     Comment: occ    Drug use: No    Sexual activity: Yes     Partners: Male     Birth control/protection: Condom   Other Topics Concern    Reaction to local anesthetic No    Pt has a pacemaker No    Pt has a defibrillator No                 Current Outpatient Medications   Medication Sig Dispense Refill    clobetasol 0.05 % External Cream Apply 1 Application topically 2 (two) times daily. 60 g 3    cetirizine 10 MG Oral Tab Take 1 tablet (10 mg total) by mouth daily. 30 tablet 0    Multiple Vitamin (MULTIVITAMIN ADULT) Oral Tab Take 1 tablet by mouth daily. With iron 1 tablet 0    Cholecalciferol (VITAMIN D) 1000 units Oral Tab Take by mouth. Allergies:   No Known Allergies    History reviewed. No pertinent past medical history.   Past Surgical History:   Procedure Laterality Date          had her baby     Social History    Socioeconomic History      Marital status: Single      Spouse name: Not on file Number of children: Not on file      Years of education: Not on file      Highest education level: Not on file    Occupational History      Not on file    Tobacco Use      Smoking status: Never      Smokeless tobacco: Never      Tobacco comments: once monthly, hooka    Substance and Sexual Activity      Alcohol use: Yes        Comment: occ      Drug use: No      Sexual activity: Yes        Partners: Male        Birth control/protection: Condom    Other Topics      Concerns:        Grew up on a farm: Not Asked        History of tanning: Not Asked        Outdoor occupation: Not Asked        Breast feeding: Not Asked        Reaction to local anesthetic: No        Pt has a pacemaker: No        Pt has a defibrillator: No    Social History Narrative      Not on file    Social Determinants of Health  Financial Resource Strain: Not on file  Food Insecurity: Not on file  Transportation Needs: Not on file  Physical Activity: Not on file  Stress: Not on file  Social Connections: Not on file  Housing Stability: Not on file  Family History   Problem Relation Age of Onset    Other (Other) Mother         goiter    Stroke Neg     Cancer Neg     Hypertension Neg     Diabetes Neg     Heart Disorder Neg     Breast Cancer Neg     Ovarian Cancer Neg                       HPI :      Patient presents with:  Itching  Derm Problem: Established pt, LOV 10/26/22, presents with itching, swelling and dryness to hands, worse at night. Onset 3-4 days ago. Using benadryl and HC with relief    Patient with recurrent eruption flares every 3 to 4 months. Itching redness over the hands. Wakes her up at night. Not really bothersome during the day tends to flare in the evening. No seasonal variation. No particular triggers. Does not always use gloves to clean. Nothing else really consistently triggering this. Has used over-the-counter hydrocortisone which helps some with itching. Typically takes a couple of weeks to resolve on its own.   No other associated symptoms has taken Benadryl at night with some relief in itching. Patient presents with concerns above. Past notes/ records and appropriate/relevant lab results including pathology and past body maps reviewed. Updated and new information noted in current visit. ROS:    Denies any other systemic complaints. No fevers, chills, night sweats, sensitivity to the sun, deeper lumps or bumps. No other skin complaints. History, medications, allergies as noted. Physical examination: Patient  well-developed well-nourished, alert oriented in no acute distress. Exam of involved, appropriate areas of skin performed, including scalp, head, neck, face,nails, hair, external eyes, including conjunctival mucosa, eyelids, lips, external ears, back, chest, abdomen, arms, legs, palms. Remarkable for lesions as noted     Tenderness patches diffuse of the right dorsal hand digits, palms fairly clear left thumb index finger, over the dorsal hand. ASSESSMENT AND PLAN:     Dermatitis  (primary encounter diagnosis)    Assessment / plan:      Dermatitis. Meds in grid. Skin care instructions reviewed. [place] use of emollients. Pathophysiology reviewed. Consider Contac allergy in differential.  Consider patch testing. Patient will let us know how they are doing over the next several weeks. Await clinical response to above therapies. Hand care reviewed continue moisturizer especially hand moisturizer with simethicone consistently. Use gloves when using pressure chemicals to clean, avoid wet work when possible. Zyrtec nightly, continue Benadryl cream.  We will add clobetasol 2-3 times daily Taper off as improving. May need to use this for couple weeks at a time. May use as needed flare,    No particular seasonal sun variation. More likely irritant process. Since this happens more randomly possible this is seasonal allergy.   Environmental factors including airborne allergens particles may be contributing as well. Await response. Given repeated episodes consider patch testing if symptoms continue to recur  Pathophysiology discussed with patient. Therapeutic options reviewed. See  Medications in grid. Instructions reviewed at length. General skin care questions answered. Reassurance regarding benign skin lesions. Signs and symptoms of skin cancer, ABCDE's of melanoma briefly reviewed. Sunscreen use, sun protection, encouraged. Followup as noted in rtc or p.r.n. Encounter Times  Including precharting, reviewing chart, prior notes obtaining history: 5 minutes, medical exam :10 minutes, notes on body map, plan, counseling 10minutes My total time spent caring for the patient on the day of the encounter: 25 minutes       The patient indicates understanding of these issues and agrees to the plan. The patient is asked to return as noted in follow-up /as noted above    This note was generated using Dragon voice recognition software. Please contact me regarding any confusion resulting from errors in recognition. Note to patient and family: The [facility] makes medical notes like these available to patients. However, be advised this is a medical document. It is intended as [id number] communication and monitoring of a patient's care needs. It is written in medical language and may contain abbreviations or verbiage that are unfamiliar. It may appear blunt or direct. Medical documents are intended to carry relevant information, facts as evident and the clinical opinion of the practitioner. No orders of the defined types were placed in this encounter. Meds & Refills for this Visit:   Requested Prescriptions     Signed Prescriptions Disp Refills    clobetasol 0.05 % External Cream 60 g 3     Sig: Apply 1 Application topically 2 (two) times daily. cetirizine 10 MG Oral Tab 30 tablet 0     Sig: Take 1 tablet (10 mg total) by mouth daily.        Dermatitis  (primary encounter diagnosis)    No orders of the defined types were placed in this encounter. Results From Past 48 Hours:  No results found for this or any previous visit (from the past 48 hour(s)). Meds This Visit:      Imaging Orders:  None     Referral Orders:  No orders of the defined types were placed in this encounter.

## 2023-07-24 ENCOUNTER — TELEPHONE (OUTPATIENT)
Dept: DERMATOLOGY CLINIC | Facility: CLINIC | Age: 43
End: 2023-07-24

## 2023-07-25 NOTE — TELEPHONE ENCOUNTER
Please see if pt picked this up. Please check with pharmacy. Formulary status is not appearing in Epic for any of the higher potency alternatives. Good rx seems reasonable.

## 2023-08-04 ENCOUNTER — PATIENT MESSAGE (OUTPATIENT)
Dept: OBGYN CLINIC | Facility: CLINIC | Age: 43
End: 2023-08-04

## 2023-08-04 NOTE — TELEPHONE ENCOUNTER
Called insurance and spoke to [employer], [facility] has been in network since 2021 for them. [facility] pt can proceed with appt on 8/9. Pt notified.

## 2023-08-04 NOTE — TELEPHONE ENCOUNTER
From: Hawa Bring  To: Perla Shields. MD Marah  Sent: 8/4/2023 10:57 AM CDT  Subject: Imtiaz Blackwell,    I have an appointment with my Doctor Migdalia Lopez on august 9th. My insurance is trying to contact at this location in [address] [place] [address] and no body is responding so that they can verify whether she is in network or not. Can you verify this so that I can attend my appointment ? ? . I my insurance information is on the file. Waiting for your reply.       Thank you   Yojana Schroeder

## 2023-08-09 ENCOUNTER — OFFICE VISIT (OUTPATIENT)
Dept: OBGYN CLINIC | Facility: CLINIC | Age: 43
End: 2023-08-09

## 2023-08-09 VITALS
HEART RATE: 52 BPM | WEIGHT: 173.38 LBS | BODY MASS INDEX: 29 KG/M2 | DIASTOLIC BLOOD PRESSURE: 79 MMHG | SYSTOLIC BLOOD PRESSURE: 116 MMHG

## 2023-08-09 DIAGNOSIS — Z12.31 VISIT FOR SCREENING MAMMOGRAM: ICD-10-CM

## 2023-08-09 DIAGNOSIS — Z01.419 ENCOUNTER FOR GYNECOLOGICAL EXAMINATION WITHOUT ABNORMAL FINDING: Primary | ICD-10-CM

## 2023-08-09 DIAGNOSIS — Z12.4 SCREENING FOR CERVICAL CANCER: ICD-10-CM

## 2023-08-09 PROCEDURE — 3074F SYST BP LT 130 MM HG: CPT | Performed by: OBSTETRICS & GYNECOLOGY

## 2023-08-09 PROCEDURE — 99396 PREV VISIT EST AGE 40-64: CPT | Performed by: OBSTETRICS & GYNECOLOGY

## 2023-08-09 PROCEDURE — 3078F DIAST BP <80 MM HG: CPT | Performed by: OBSTETRICS & GYNECOLOGY

## 2023-08-09 NOTE — PROGRESS NOTES
Neelam Quinn is a 44year old female [id number] Patient's last menstrual period was 2023 (approximate). Patient presents with:  Gyn Exam: ANNUAL EXAM   .     Her cycles are  regular. She has no gyne complaints. She is not sexually active at this time- does not need contraception. OBSTETRICS HISTORY:  OB History     T1    L1    SAB0  IAB0  Ectopic0  Multiple0  Live Births1     GYNE HISTORY:   Pap Date: 19  Pap Result Notes: PAPHPV NEG  Follow Up Recommendation: [facility] 22 BILAT BENIGN      MEDICAL HISTORY:  History reviewed. No pertinent past medical history. Past Surgical History:   Procedure Laterality Date          had her baby         SOCIAL HISTORY:  Social History    Socioeconomic History      Marital status: Single    Tobacco Use      Smoking status: Never      Smokeless tobacco: Never      Tobacco comments: once monthly, hooka    Substance and Sexual Activity      Alcohol use: Yes        Comment: occ      Drug use: No      Sexual activity: Yes        Partners: Male        Birth control/protection: Condom    Other Topics      Concerns:        Reaction to local anesthetic: No        Pt has a pacemaker: No        Pt has a defibrillator: No       FAMILY HISTORY:  Family History   Problem Relation Age of Onset    Other (Other) Mother         goiter    Stroke Neg     Cancer Neg     Hypertension Neg     Diabetes Neg     Heart Disorder Neg     Breast Cancer Neg     Ovarian Cancer Neg        MEDICATIONS:    Current Outpatient Medications:     Multiple Vitamin (MULTIVITAMIN ADULT) Oral Tab, Take 1 tablet by mouth daily. With iron, Disp: 1 tablet, Rfl: 0    Cholecalciferol (VITAMIN D) 1000 units Oral Tab, Take by mouth., Disp: , Rfl:     clobetasol 0.05 % External Cream, Apply 1 Application topically 2 (two) times daily. (Patient not taking: Reported on 2023), Disp: 60 g, Rfl: 3    cetirizine 10 MG Oral Tab, Take 1 tablet (10 mg total) by mouth daily.  (Patient not taking: Reported on 8/9/2023), Disp: 30 tablet, Rfl: 0    ALLERGIES:  No Known Allergies    Blood pressure 116/79, pulse 52, weight 173 lb 6.4 oz (78.7 kg), last menstrual period 07/23/2023. Review of Systems:  Constitutional:  Denies fatigue, night sweats, hot flashes  Eyes:  denies blurred or double vision  Cardiovascular:  denies chest pain or palpitations  Respiratory:  denies shortness of breath  Gastrointestinal:  denies heartburn, abdominal pain, diarrhea or constipation  Genitourinary:  denies dysuria, incontinence, abnormal vaginal discharge, vaginal itching  Musculoskeletal:  denies back pain. Skin/Breast:  Denies any breast pain, lumps, or discharge. Neurological:  denies headaches, extremity weakness or numbness. Psychiatric: denies depression or anxiety. Endocrine:   denies excessive thirst or urination. Heme/Lymph:  denies history of anemia, easy bruising or bleeding. Depression Screening (PHQ-2/PHQ-9): Over the LAST 2 WEEKS   Little interest or pleasure in doing things: Not at all    Feeling down, depressed, or hopeless: Not at all    PHQ-2 SCORE: 0           PHYSICAL EXAM:   Constitutional: well developed, well nourished  Head/Face: normocephalic  Neck/Thyroid: thyroid symmetric, no thyromegaly, no nodules, no adenopathy  Lymphatic:no abnormal supraclavicular or axillary adenopathy is noted  Breast: normal without palpable masses, tenderness, asymmetry, nipple discharge, nipple retraction or skin changes  Respiratory:  lungs clear to auscultation bilaterally  Cardiovascular: regular rate and rhythm, no significant murmur  Abdomen:  soft, nontender, nondistended, no masses  Skin/Hair: no unusual rashes or bruises  Extremities: no edema, no cyanosis  Psychiatric:  Oriented to time, place, person and situation.  Appropriate mood and affect    Pelvic Exam:  External Genitalia: normal appearance, hair distribution, and no lesions  Urethral Meatus:  normal in size, location, without lesions and prolapse  Bladder:  No fullness, masses or tenderness  Vagina:  Normal appearance without lesions, no abnormal discharge  Cervix:  Normal without tenderness on motion  Uterus: normal in size, contour, position, mobility, without tenderness  Adnexa: normal without masses or tenderness  Perineum: normal  Anus: no hemorroids     Assessment & Plan:    Encounter for gynecological examination without abnormal finding  (primary encounter diagnosis)  Visit for screening mammogram  ASCCP guidelines discussed,cotest done,rtc 1 year for annual exam   SBE encouraged  No orders of the defined types were placed in this encounter.       Requested Prescriptions      No prescriptions requested or ordered in this encounter       [facility] AIMEE 2D+3D SCREENING BILAT (CPT=77067/45196)

## 2023-08-10 LAB — HPV I/H RISK 1 DNA SPEC QL NAA+PROBE: NEGATIVE

## 2023-10-17 ENCOUNTER — OFFICE VISIT (OUTPATIENT)
Dept: FAMILY MEDICINE CLINIC | Facility: CLINIC | Age: 43
End: 2023-10-17
Payer: COMMERCIAL

## 2023-10-17 VITALS
WEIGHT: 171 LBS | SYSTOLIC BLOOD PRESSURE: 108 MMHG | HEIGHT: 64.7 IN | HEART RATE: 51 BPM | BODY MASS INDEX: 28.84 KG/M2 | DIASTOLIC BLOOD PRESSURE: 69 MMHG | TEMPERATURE: 98 F

## 2023-10-17 DIAGNOSIS — E55.9 VITAMIN D DEFICIENCY: ICD-10-CM

## 2023-10-17 DIAGNOSIS — Z00.00 WELL ADULT EXAM: Primary | ICD-10-CM

## 2023-10-17 DIAGNOSIS — E66.3 OVERWEIGHT (BMI 25.0-29.9): ICD-10-CM

## 2023-10-17 PROCEDURE — 3074F SYST BP LT 130 MM HG: CPT | Performed by: FAMILY MEDICINE

## 2023-10-17 PROCEDURE — 3008F BODY MASS INDEX DOCD: CPT | Performed by: FAMILY MEDICINE

## 2023-10-17 PROCEDURE — 99396 PREV VISIT EST AGE 40-64: CPT | Performed by: FAMILY MEDICINE

## 2023-10-17 PROCEDURE — 90715 TDAP VACCINE 7 YRS/> IM: CPT | Performed by: FAMILY MEDICINE

## 2023-10-17 PROCEDURE — 90471 IMMUNIZATION ADMIN: CPT | Performed by: FAMILY MEDICINE

## 2023-10-17 PROCEDURE — 3078F DIAST BP <80 MM HG: CPT | Performed by: FAMILY MEDICINE

## 2023-10-23 ENCOUNTER — PATIENT MESSAGE (OUTPATIENT)
Dept: PHYSICAL MEDICINE AND REHAB | Facility: CLINIC | Age: 43
End: 2023-10-23

## 2023-10-30 NOTE — TELEPHONE ENCOUNTER
From: Kay Guan  To: Lilianasarahi Ornelas  Sent: 10/23/2023 9:48 PM CDT  Subject: Hello Doctor    I have some concerns about my last visitation for my neck issue Anastasia Talley) to you and you asked me one question that whether I get any tingling in my arms or not. I can feel this from last two three days and just once that too when I asleep. Please let me know if there is anything i should be worried about or what. Thank you.   Janay Menjivar

## 2023-11-06 ENCOUNTER — PATIENT MESSAGE (OUTPATIENT)
Dept: FAMILY MEDICINE CLINIC | Facility: CLINIC | Age: 43
End: 2023-11-06

## 2023-11-07 ENCOUNTER — NURSE TRIAGE (OUTPATIENT)
Dept: FAMILY MEDICINE CLINIC | Facility: CLINIC | Age: 43
End: 2023-11-07

## 2023-11-07 DIAGNOSIS — M79.671 PAIN OF BOTH HEELS: Primary | ICD-10-CM

## 2023-11-07 DIAGNOSIS — M79.672 PAIN OF BOTH HEELS: Primary | ICD-10-CM

## 2023-11-07 NOTE — TELEPHONE ENCOUNTER
Dr Barron Simmonds below, any additional advice ? Xray ,specialist or in office evaluation first ? Thanks. Action Requested: Summary for Provider     []  Critical Lab, Recommendations Needed  [x] Need Additional Advice  []   FYI    []   Need Orders  [] Need Medications Sent to Pharmacy  []  Other     SUMMARY: home care provided per protocol , closely monitor symptoms and chapis us back for updates, advised urgent care or immediate care  for worsening symptoms . Reason for call: Heel Pain  Onset: Data Unavailable    Reported bilateral heel pain, alternately right then left/ left then right heel, x 2-3 weeks now. Comes and goes, lasted for a few minutes only . Any activity like walking using regular shoes, high heels or slippers, denied any sharp pain. Denied any skin discoloration, normal color and temperature, non tender, not swollen. Currently pain free . She is doing basic exercises, taking tylenol and icing it . Denied  any injury or recent long travel,no cp, no sob.        Reason for Disposition   Caused by overuse from recent vigorous activity (e.g., aerobics, jogging/running, physical work, prolonged walking, sports)    Protocols used: Leg Pain-A-[place]

## 2023-11-07 NOTE — TELEPHONE ENCOUNTER
From: Hawa Bring  To: Juan Pablo Kanchan Naomie  Sent: 11/6/2023 11:38 AM CST  Subject: [place] Doctor,    I just have a concern about t this heel pain which is happening to me. It's not happening everyday but when i wake up in the morning or go to the gym or wear heels. I'm doing some exercises and its helping and putting icepack too. Please let me know if I should some specialist or no. Thank you.     Yojana Schroeder

## 2023-11-15 ENCOUNTER — LAB ENCOUNTER (OUTPATIENT)
Dept: LAB | Age: 43
End: 2023-11-15
Attending: FAMILY MEDICINE
Payer: COMMERCIAL

## 2023-11-15 DIAGNOSIS — Z00.00 WELL ADULT EXAM: ICD-10-CM

## 2023-11-15 LAB
ALBUMIN SERPL-MCNC: 4.2 G/DL (ref 3.2–4.8)
ALBUMIN/GLOB SERPL: 1.4 {RATIO} (ref 1–2)
ALP LIVER SERPL-CCNC: 47 U/L
ALT SERPL-CCNC: 20 U/L
ANION GAP SERPL CALC-SCNC: 7 MMOL/L (ref 0–18)
AST SERPL-CCNC: 20 U/L (ref ?–34)
BILIRUB SERPL-MCNC: 0.4 MG/DL (ref 0.3–1.2)
BUN BLD-MCNC: 8 MG/DL (ref 9–23)
BUN/CREAT SERPL: 9.9 (ref 10–20)
CALCIUM BLD-MCNC: 9.2 MG/DL (ref 8.7–10.4)
CHLORIDE SERPL-SCNC: 103 MMOL/L (ref 98–112)
CHOLEST SERPL-MCNC: 153 MG/DL (ref ?–200)
CO2 SERPL-SCNC: 25 MMOL/L (ref 21–32)
CREAT BLD-MCNC: 0.81 MG/DL
EGFRCR SERPLBLD CKD-EPI 2021: 93 ML/MIN/1.73M2 (ref 60–?)
FASTING PATIENT LIPID ANSWER: YES
FASTING STATUS PATIENT QL REPORTED: YES
GLOBULIN PLAS-MCNC: 2.9 G/DL (ref 2.8–4.4)
GLUCOSE BLD-MCNC: 102 MG/DL (ref 70–99)
HDLC SERPL-MCNC: 43 MG/DL (ref 40–59)
LDLC SERPL CALC-MCNC: 90 MG/DL (ref ?–100)
NONHDLC SERPL-MCNC: 110 MG/DL (ref ?–130)
OSMOLALITY SERPL CALC.SUM OF ELEC: 279 MOSM/KG (ref 275–295)
POTASSIUM SERPL-SCNC: 4.1 MMOL/L (ref 3.5–5.1)
PROT SERPL-MCNC: 7.1 G/DL (ref 5.7–8.2)
SODIUM SERPL-SCNC: 135 MMOL/L (ref 136–145)
TRIGL SERPL-MCNC: 111 MG/DL (ref 30–149)
VLDLC SERPL CALC-MCNC: 18 MG/DL (ref 0–30)

## 2023-11-15 PROCEDURE — 80053 COMPREHEN METABOLIC PANEL: CPT

## 2023-11-15 PROCEDURE — 80061 LIPID PANEL: CPT

## 2023-11-15 PROCEDURE — 36415 COLL VENOUS BLD VENIPUNCTURE: CPT

## 2023-11-26 ENCOUNTER — PATIENT MESSAGE (OUTPATIENT)
Dept: FAMILY MEDICINE CLINIC | Facility: CLINIC | Age: 43
End: 2023-11-26

## 2023-11-26 DIAGNOSIS — R73.9 HYPERGLYCEMIA: Primary | ICD-10-CM

## 2023-11-27 NOTE — TELEPHONE ENCOUNTER
Blog[employer] message sent. Cholesterol levels are well-controlled. Glucose slightly borderline. I will add A1c test to rule out diabetes. Continue healthy diet and exercise  Results released through My Chart.    Written by Hailee Spaulding MD on 11/26/2023  1:01 PM CST  Seen by patient Neftaly Francisco on 11/26/2023  1:59 PM

## 2023-12-04 ENCOUNTER — OFFICE VISIT (OUTPATIENT)
Dept: PODIATRY CLINIC | Facility: CLINIC | Age: 43
End: 2023-12-04
Payer: COMMERCIAL

## 2023-12-04 DIAGNOSIS — M72.2 PLANTAR FASCIITIS: Primary | ICD-10-CM

## 2023-12-04 PROCEDURE — 99203 OFFICE O/P NEW LOW 30 MIN: CPT | Performed by: STUDENT IN AN ORGANIZED HEALTH CARE EDUCATION/TRAINING PROGRAM

## 2023-12-04 NOTE — PROGRESS NOTES
[facility], [facility] Podiatry  Progress Note      Annette Wagner is a 44year old female. Chief Complaint   Patient presents with    Heel Pain     Pain in heel on both feet. On and off mostly in the morning. Pain ongoing for 1 month. HPI:     Patient is a pleasant 56-year-old female presents to clinic today for evaluation of bilateral heel pain. She relates that the pain is off and on mostly in the morning. Allergies: Patient has no known allergies. Current Outpatient Medications   Medication Sig Dispense Refill    Multiple Vitamin (MULTIVITAMIN ADULT) Oral Tab Take 1 tablet by mouth daily. With iron 1 tablet 0    Cholecalciferol (VITAMIN D) 1000 units Oral Tab Take by mouth. History reviewed. No pertinent past medical history. Past Surgical History:   Procedure Laterality Date          had her baby      Family History   Problem Relation Age of Onset    Other (Other) Mother         goiter    Stroke Neg     Cancer Neg     Hypertension Neg     Diabetes Neg     Heart Disorder Neg     Breast Cancer Neg     Ovarian Cancer Neg       Social History     Socioeconomic History    Marital status: Single   Tobacco Use    Smoking status: Never    Smokeless tobacco: Never    Tobacco comments:     once monthly, hooka   Substance and Sexual Activity    Alcohol use: Yes     Comment: occ    Drug use: No    Sexual activity: Yes     Partners: Male     Birth control/protection: Condom   Other Topics Concern    Reaction to local anesthetic No    Pt has a pacemaker No    Pt has a defibrillator No           REVIEW OF SYSTEMS:     Denies nause, fever, chills  No calf pain  Denies chest pain or SOB      EXAM:   LMP 10/01/2023 (Approximate)   GENERAL: well developed, well nourished, in no apparent distress  EXTREMITIES:   1. Integument: Normal skin temperature and turgor  2.  Vascular: Dorsalis pedis two out of four bilateral and posterior tibial pulses two out of   four bilateral, capillary refill normal.   3. Musculoskeletal: All muscle groups are graded 5 out of 5 in the foot and ankle. Mild tenderness with palpation to bilateral medial calcaneal tubercles   4. Neurological: Normal sharp dull sensation; reflexes normal.             ASSESSMENT AND PLAN:   Diagnoses and all orders for this visit:    Plantar fasciitis  -     Physical Therapy Referral - [facility] [place]        Plan:     Pt seen and examined. Nicole discussed with pt  Discussed etiology of condition along with tx options  Discussed the importance of LE stretching  Physical therapy referral dispensed   Allimed inserts dispensed  Don't walk barefoot  and use supportive shoes  RTC as needed. The patient indicates understanding of these issues and agrees to the plan.         Sophia Morgan DPM

## 2024-01-24 ENCOUNTER — PATIENT MESSAGE (OUTPATIENT)
Dept: FAMILY MEDICINE CLINIC | Facility: CLINIC | Age: 44
End: 2024-01-24

## 2024-01-24 DIAGNOSIS — M54.2 NECK PAIN: Primary | ICD-10-CM

## 2024-01-24 DIAGNOSIS — Z12.31 ENCOUNTER FOR SCREENING MAMMOGRAM FOR MALIGNANT NEOPLASM OF BREAST: ICD-10-CM

## 2024-02-06 ENCOUNTER — TELEPHONE (OUTPATIENT)
Dept: ADMINISTRATIVE | Age: 44
End: 2024-02-06

## 2024-02-06 NOTE — TELEPHONE ENCOUNTER
Hello    This patients [facility] HMO plan has an alternate IPA group.     Our IPA would be [facility]  This patient appears to be with IPA group [facility].  We are unable to process referrals for this patient due to IPA not [facility].   This patient will need to proceed with care through her [facility] PCP.    Medical Group IPA: [facility]     This request has been closed.   Please advise if you have any questions.   Thank you  Maren

## 2024-02-09 ENCOUNTER — OFFICE VISIT (OUTPATIENT)
Dept: PHYSICAL MEDICINE AND REHAB | Facility: CLINIC | Age: 44
End: 2024-02-09
Payer: COMMERCIAL

## 2024-02-09 VITALS — WEIGHT: 171 LBS | HEIGHT: 64.7 IN | HEART RATE: 52 BPM | BODY MASS INDEX: 28.84 KG/M2 | OXYGEN SATURATION: 99 %

## 2024-02-09 DIAGNOSIS — M47.812 CERVICAL SPONDYLOSIS: Primary | ICD-10-CM

## 2024-02-09 DIAGNOSIS — M54.2 NECK PAIN: ICD-10-CM

## 2024-02-09 PROCEDURE — 3008F BODY MASS INDEX DOCD: CPT | Performed by: PHYSICAL MEDICINE & REHABILITATION

## 2024-02-09 PROCEDURE — 99213 OFFICE O/P EST LOW 20 MIN: CPT | Performed by: PHYSICAL MEDICINE & REHABILITATION

## 2024-02-09 NOTE — PROGRESS NOTES
RETURN PATIENT VISIT    CHIEF COMPLAINT  Back pain    INTERVAL HISTORY  Martinez Fernandez is a 43 year old who was last seen in clinic on 7/21/2023, at that visit patient was referred to myofascial physical therapy.  Patient states that she has bilateral tingling sensation in the palms as well as ongoing complaints of axial neck pain.  She is not currently using any pain medication or muscle relaxers and states that the tingling sensation, mostly at night.  Currently her pain is rated 0/10.    She states that at night she sleeps with her wrists bent and this makes her palms go numb. She has been exercising 2-3x per week and intermittently feels some stiffenss in the     REVIEW OF SYSTEMS  Review of systems was completed with the patient today as pertinent to today's visit    PHYSICAL EXAMINATION  CONSTITUTIONAL: Well-appearing, in no apparent distress  EYES: No scleral icterus or conjunctival hemorrhage  CARDIOVASCULAR: Skin warm and well-perfused, no peripheral edema  RESPIRATORY: Breathing unlabored without accessory muscle use  PSYCHIATRIC: Alert, cooperative, appropriate mood and affect  SKIN: No lesions or rashes on exposed skin  MUSCULOSKELETAL: Durkan's negative, Tinel's negative at the wrist bilaterally.  Range of motion mildly painful in extension without positive Spurling's.  NEUROLOGIC: Maintain strength and sensation in the hands.    REVIEW OF PRIOR X-RAYS/STUDIES  Independently reviewed the plain film radiographs of the cervical spine dated 6/2/2023 which reveal mild straightening of the cervical lordosis, osteophyte formation at the C5-6 disc with Mario Alberto degenerative disc changes noted at this level. Suspected foraminal narrowing at C5-6 and C6-7 bilaterally.     IMPRESSION/DIAGNOSIS  1.    Encounter Diagnoses   Name Primary?    Cervical spondylosis Yes    Neck pain      early carpal tunnel syndrome?    TREATMENT/PLAN  Bilateral wrist bracing at night to address the paresthesias in the hands.  If  persistent would obtain EMG of the bilateral upper extremities.  For now she will continue with home exercises, new referral sent for physical therapy to work on cervical spine stabilization and development of home exercise program.    Education was provided regarding the above impression/diagnosis and treatment options/plan were discussed.  All questions were answered during today's visit.  Patient will contact clinic if any other questions or concerns.        Homero Long DO  Physical Medicine and Rehabilitation  Interventional Spine and Sports Medicine   [facility]

## 2024-02-09 NOTE — PATIENT INSTRUCTIONS
Bilateral carpal tunnel braces at night with consistency.     See me back if your symptoms persist despite that.

## 2024-02-21 ENCOUNTER — HOSPITAL ENCOUNTER (OUTPATIENT)
Dept: MAMMOGRAPHY | Facility: HOSPITAL | Age: 44
Discharge: HOME | End: 2024-02-21
Attending: FAMILY MEDICINE
Payer: COMMERCIAL

## 2024-02-21 DIAGNOSIS — Z12.31 ENCOUNTER FOR SCREENING MAMMOGRAM FOR MALIGNANT NEOPLASM OF BREAST: ICD-10-CM

## 2024-02-21 PROCEDURE — 77067 SCR MAMMO BI INCL CAD: CPT | Performed by: FAMILY MEDICINE

## 2024-02-21 PROCEDURE — 77063 BREAST TOMOSYNTHESIS BI: CPT | Performed by: FAMILY MEDICINE

## 2024-03-05 ENCOUNTER — TELEPHONE (OUTPATIENT)
Dept: PHYSICAL THERAPY | Facility: HOSPITAL | Age: 44
End: 2024-03-05

## 2024-03-06 ENCOUNTER — OFFICE VISIT (OUTPATIENT)
Dept: PHYSICAL THERAPY | Age: 44
End: 2024-03-06
Attending: PHYSICAL MEDICINE & REHABILITATION
Payer: COMMERCIAL

## 2024-03-06 DIAGNOSIS — M47.812 CERVICAL SPONDYLOSIS: Primary | ICD-10-CM

## 2024-03-06 DIAGNOSIS — M54.2 NECK PAIN: ICD-10-CM

## 2024-03-06 PROCEDURE — 97110 THERAPEUTIC EXERCISES: CPT | Performed by: PHYSICAL THERAPIST

## 2024-03-06 PROCEDURE — 97161 PT EVAL LOW COMPLEX 20 MIN: CPT | Performed by: PHYSICAL THERAPIST

## 2024-03-06 NOTE — PROGRESS NOTES
SPINE EVALUATION:   Referring Physician: Dr. Long  Diagnosis: Cervical spondylosis (M47.812)  Neck pain (M54.2)     Date of Service: 3/6/2024  MEDICATION CHANGES SINCE LAST MD REVIEW? NO     PATIENT SUMMARY   Martinez Fernandez is a 43 year old female who presents to therapy today with complaints of neck pain since December 2022 for no apparent cause and had h/o treatments with chiropractic, massage with good relief of symptoms.  Pt describes pain level current 5/10, at best 0/10, at worst 6/10.   Symptoms aggravated by looking down while working and when exercising at the gym; ice helps.  Current functional limitations include difficulty doing her regular work outs and working looking down..     Martinez describes prior level of function being (I) and active; lives with daughter and shares homemaking chores; heaviest chore is carrying groceries.   Patient works FT as an  using a laptop  Pt goals include pain relief.  Past medical history was reviewed with Martinez. Significant findings include  has no past medical history of Anxiety, Carotid stenosis, Chronic atrial fibrillation (HCC), Dementia (HCC), Depression, Diabetes (HCC), Essential hypertension, Fibromyalgia, Migraines, Myocardial infarction (HCC), Seizure disorder (HCC), Stroke (HCC), Traumatic brain injury (HCC), or Tremor.   Pt denies diplopia, dysarthria, dysphasia, dizziness, drop attacks, bowel/bladder changes, saddle anesthesia, and TYE LE N/T.    ASSESSMENT  Martinez presents to physical therapy evaluation with primary c/o localized neck pain. The results of the objective tests and measures show symptoms and findings consistent with tissue irritation due to poor posture in sustained flexion using devices including her laptop for work resulting in tightness of anterior musculature and weakness of posterior structures and tissue overload.  Functional deficits include but are not limited to decreased tolerance to sustained sitting doing work  tasks using the computer and doing her regular work outs.  Signs and symptoms are consistent with medical diagnoses as stated above. Pt and PT discussed evaluation findings, pathology, POC and HEP.  Pt voiced understanding and performs HEP correctly without reported pain. Skilled Physical Therapy is medically necessary to address the above impairments and reach functional goals.     Precautions:  None  OBJECTIVE:   Observation/Posture: forward head and rounded shoulders  Neuro Screen: negative    Cervical AROM: (* denotes performed with pain)  Flexion: WNL  Extension: WNL  Sidebending: R min loss; L min loss  Rotation: R WNL; L WNL    Accessory motion: WNL  Palpation: tenderness and muscle guarding to scalenes and levator scapulae    Strength: (* denotes performed with pain)  UE/Scapular   (B)UE strength grossly 4+ to 5/5; (B) LT, MT, rhomboids grossly 3+ to 4-/5     Strength: R 61 lbs; L 51 lbs     Flexibility:   UE/Scapular   Upper Trap: R min loss; L min loss  Levator Scap: R min loss; L min loss  Pec Major: R min loss; L min loss  Scalenes: R min loss, L min loss     Special tests:   (-) cervical compression/distraction; no directional preference with mechanical testing    Gait: pt ambulates on level ground with normal mechanics.  Balance: SLS R WNL, L WNL    Today’s Treatment and Response:   Pt education was provided on exam findings, treatment diagnosis, treatment plan, expectations, and prognosis.   Pt was also provided recommendations for activity modifications, possible soreness after evaluation, modalities as needed [ice/heat], postural corrections, ergonomics, pain science education , and importance of remaining active    Therapeutic exercise (25 mins)  Scifit UE only L1 3 mins ea fwd/retro focusing on maintaining upright posture  Cervical retraction in sitting 3x10  Cervical extension with towel at 45 degrees x 10  (B) scalene selfstretch/releases with strap 3 x 30 secs ea  Doorway pec stretch A and  90/90 3 x 30 secs ea  Patient was instructed in and issued a HEP for: Refer to patient instructions    Charges: PT Eval Low Complexity, 20 mins; Therapeutic exercise x 2     Total Timed Treatment: 25 mins    Total Treatment Time: 45 mins     Based on clinical rationale and outcome measures, this evaluation involved Low Complexity decision making due to 1-2 personal factors/comorbidities, 4+ body structures involved/activity limitations, and unstable symptoms including changing pain levels.  PLAN OF CARE:    Goals: (to be met in 8 visits)   Martinez will consistently reduce her symptoms with a home program to reduce tissue irritability and allow tissue healing.  Martinez will demonstrated improved postural awareness to allow reduction of symptoms to 2-3 at worst and allow work without symptom aggravation.  Martinez will have improved scapular strength to 4+ to 5/5 to allow painfree exercise/work outs and IADL's  Martinez will be (I) with a home program to allow discharge from PT towards further self-recovery and maintenance of function.    Frequency / Duration: Patient will be seen for 1-2 x/week or a total of 8 visits over a 90 day period. Treatment will include: Manual Therapy, Neuromuscular Re-education, Self-Care Home Management, Therapeutic Activities, Therapeutic Exercise, and Home Exercise Program instruction    Education or treatment limitation: None  Rehab Potential:good    Neck Disability Index Score  Score: 18 % (3/5/2024  3:34 PM)      Martinez was advised of these findings, precautions, and treatment options and has agreed to actively participate in planning and for this course of care.    Thank you for your referral. Please co-sign or sign and return this letter via fax as soon as possible to [phone]. If you have any questions, please contact me at Dept: [phone]    Sincerely,  Electronically signed by therapist: Francisco Javier Earl, PT    Physician's certification required: Yes  I certify the need for  these services furnished under this plan of treatment and while under my care.    X___________________________________________________ Date____________________    Certification From: 3/6/2024  To:6/4/2024

## 2024-03-12 ENCOUNTER — TELEPHONE (OUTPATIENT)
Dept: PHYSICAL THERAPY | Facility: HOSPITAL | Age: 44
End: 2024-03-12

## 2024-03-14 ENCOUNTER — PATIENT MESSAGE (OUTPATIENT)
Dept: PHYSICAL MEDICINE AND REHAB | Facility: CLINIC | Age: 44
End: 2024-03-14

## 2024-03-14 ENCOUNTER — PATIENT MESSAGE (OUTPATIENT)
Dept: FAMILY MEDICINE CLINIC | Facility: CLINIC | Age: 44
End: 2024-03-14

## 2024-03-14 ENCOUNTER — APPOINTMENT (OUTPATIENT)
Dept: PHYSICAL THERAPY | Age: 44
End: 2024-03-14
Attending: PHYSICAL MEDICINE & REHABILITATION
Payer: COMMERCIAL

## 2024-03-14 DIAGNOSIS — M79.18 CERVICAL MYOFASCIAL PAIN SYNDROME: Primary | ICD-10-CM

## 2024-03-14 DIAGNOSIS — M54.2 NECK PAIN: ICD-10-CM

## 2024-03-14 DIAGNOSIS — M47.812 CERVICAL SPONDYLOSIS: ICD-10-CM

## 2024-03-14 NOTE — TELEPHONE ENCOUNTER
From: Martinez Fernandez  To: Homero Long  Sent: 3/14/2024 11:38 AM CDT  Subject: Hello     Hello Doctor,    I would like to request you to send me the new Referral for [facility] for my physical therapy instead of getting it done at [facility] as my Insurance is not covering 100%.    I would appreciate it if you could send me this by end of this week.    Thank you,  Martinez Fernandez

## 2024-03-15 NOTE — TELEPHONE ENCOUNTER
From: Martinez Fernandez  To: Batsheva Akbar  Sent: 3/14/2024 4:45 PM CDT  Subject: Hello    Hello Doctor,     I would like to request you to send me the new Referral (from primary physician) for [facility] which is located in [place] my physical therapy instead of getting it done at [facility] at hospital facility as my insurance is not covering 100%.      I would appreciate it if you could send me this by end of this week.     Thank you,  Martinez Fernandez

## 2024-03-19 ENCOUNTER — TELEPHONE (OUTPATIENT)
Dept: FAMILY MEDICINE CLINIC | Facility: CLINIC | Age: 44
End: 2024-03-19

## 2024-03-19 DIAGNOSIS — M47.812 CERVICAL SPONDYLOSIS: Primary | ICD-10-CM

## 2024-03-19 DIAGNOSIS — M54.2 NECK PAIN: ICD-10-CM

## 2024-03-19 NOTE — TELEPHONE ENCOUNTER
Patient called and is requesting if she can get an order for thyroid test/labs  She said she is not having any symptoms but would like to get it tested every 6 months. Please advise.   [facility]

## 2024-03-19 NOTE — TELEPHONE ENCOUNTER
Norm Henriquez        Your test results are within normal limits. Please follow up with our office if you have any questions or concerns.     Thank you.     Batsheva Akbar MD     [phone]       There was no recommendation to complete thyroid labs any sooner than 1 year.

## 2024-03-19 NOTE — TELEPHONE ENCOUNTER
Patient called and is requesting physical therapy referral for [facility] instead.  Is asking if referral can be uploaded on [employer].     Patient is requesting referral.     Name of specialist and specialty department : [facility]  Reason for visit with the specialist: Physical Therapy   Address of the specialist office: [address] [place] [address]  Appointment date: N/A          CSS informed patient the turnaround time for referral is 5-7 business days.  Patient was informed to check their [employer] account for referral status.

## 2024-03-19 NOTE — TELEPHONE ENCOUNTER
Dr. Akbar,     Patient called requesting referral to physical therapy at [facility] in [place].     Pended referral please review diagnosis and sign off if you agree.    Thank you.  Kira Mott  Managed Care

## 2024-03-20 ENCOUNTER — APPOINTMENT (OUTPATIENT)
Dept: PHYSICAL THERAPY | Age: 44
End: 2024-03-20
Attending: PHYSICAL MEDICINE & REHABILITATION
Payer: COMMERCIAL

## 2024-03-28 ENCOUNTER — APPOINTMENT (OUTPATIENT)
Dept: PHYSICAL THERAPY | Age: 44
End: 2024-03-28
Attending: PHYSICAL MEDICINE & REHABILITATION
Payer: COMMERCIAL

## 2024-04-04 ENCOUNTER — APPOINTMENT (OUTPATIENT)
Dept: PHYSICAL THERAPY | Age: 44
End: 2024-04-04
Attending: PHYSICAL MEDICINE & REHABILITATION
Payer: COMMERCIAL

## 2024-04-10 ENCOUNTER — MED REC SCAN ONLY (OUTPATIENT)
Dept: PHYSICAL MEDICINE AND REHAB | Facility: CLINIC | Age: 44
End: 2024-04-10

## 2024-04-11 ENCOUNTER — APPOINTMENT (OUTPATIENT)
Dept: PHYSICAL THERAPY | Age: 44
End: 2024-04-11
Attending: PHYSICAL MEDICINE & REHABILITATION
Payer: COMMERCIAL

## 2024-04-17 ENCOUNTER — OFFICE VISIT (OUTPATIENT)
Dept: FAMILY MEDICINE CLINIC | Facility: CLINIC | Age: 44
End: 2024-04-17
Payer: COMMERCIAL

## 2024-04-17 ENCOUNTER — HOSPITAL ENCOUNTER (OUTPATIENT)
Dept: GENERAL RADIOLOGY | Age: 44
Discharge: HOME | End: 2024-04-17
Payer: COMMERCIAL

## 2024-04-17 VITALS
HEART RATE: 58 BPM | HEIGHT: 64 IN | WEIGHT: 169 LBS | DIASTOLIC BLOOD PRESSURE: 69 MMHG | RESPIRATION RATE: 16 BRPM | OXYGEN SATURATION: 100 % | TEMPERATURE: 98 F | BODY MASS INDEX: 28.85 KG/M2 | SYSTOLIC BLOOD PRESSURE: 108 MMHG

## 2024-04-17 DIAGNOSIS — M25.552 BILATERAL HIP PAIN: ICD-10-CM
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
## 2024-04-17 DIAGNOSIS — M79.601 PAIN IN RIGHT ARM: Primary | ICD-10-CM

## 2024-04-17 DIAGNOSIS — M25.551 BILATERAL HIP PAIN: ICD-10-CM

## 2024-04-17 PROCEDURE — 73523 X-RAY EXAM HIPS BI 5/> VIEWS: CPT

## 2024-04-17 NOTE — PATIENT INSTRUCTIONS
Take Ibuprofen 400 mg every 6-8 hours as needed  Ice 20 minutes on/off throughout day for next 3-4 days  Wear elbow brace during day and remove at night for next 3-4 days

## 2024-04-17 NOTE — PROGRESS NOTES
HPI:    Patient ID: Martinez Fernandez is a 43 year old female.    HPI     Patient here in office with complaint of right forearm pain, started 2 weeks ago.  Aggravated when working out/lifting weights and when driving.  Denies injury or paresthesia.  Has tried massage and elbow brace intermittently with moderate improvement.      Also complains of bilateral hip pain s/p fall in January 2024.  Aggravated with walking.  Interested in x-rays of hip/pelvis.    Review of Systems   Constitutional: Negative.    Respiratory: Negative.     Cardiovascular: Negative.    Musculoskeletal:         Right forearm/bilateral hip pain   Skin: Negative.    Neurological: Negative.    Psychiatric/Behavioral: Negative.              Current Outpatient Medications   Medication Sig Dispense Refill    Multiple Vitamin (MULTIVITAMIN ADULT) Oral Tab Take 1 tablet by mouth daily. With iron 1 tablet 0    Cholecalciferol (VITAMIN D) 1000 units Oral Tab Take by mouth.       Allergies:No Known Allergies   /69   Pulse 58   Temp 97.7 °F (36.5 °C) (Temporal)   Resp 16   Ht 5' 4\" (1.626 m)   Wt 169 lb (76.7 kg)   LMP 04/01/2024   SpO2 100%   BMI 29.01 kg/m²   Body mass index is 29.01 kg/m².  PHYSICAL EXAM:   Physical Exam  Vitals reviewed.   Constitutional:       General: She is not in acute distress.     Appearance: Normal appearance. She is not ill-appearing.   Cardiovascular:      Rate and Rhythm: Normal rate and regular rhythm.      Heart sounds: Normal heart sounds. No murmur heard.     No friction rub. No gallop.   Pulmonary:      Effort: Pulmonary effort is normal. No respiratory distress.      Breath sounds: Normal breath sounds. No stridor. No wheezing, rhonchi or rales.   Chest:      Chest wall: No tenderness.   Musculoskeletal:         General: Tenderness and signs of injury present. Normal range of motion.      Comments: Right lateral forearm/bilateral hip tenderness   Skin:     General: Skin is warm.      Findings: No rash.    Neurological:      General: No focal deficit present.      Mental Status: She is alert and oriented to person, place, and time.   Psychiatric:         Mood and Affect: Mood normal.         Behavior: Behavior normal.         Thought Content: Thought content normal.         Judgment: Judgment normal.                ASSESSMENT/PLAN:   1. Pain in right arm  -Suspect lateral epicondylitis versus other  -Take ibuprofen 400 mg every 6-8 hours as needed  - Ice right arm 20 minutes on/off throughout day for next 3 to 4 days  - Wear elbow brace during the day and remove at night for the next 3 to 4 days    2. Bilateral hip pain  - XR HIP W OR WO PELVIS(MIN 5 VIEWS),BILAT(CPT=73523); Future    3. Fall, initial encounter  - XR HIP W OR WO PELVIS(MIN 5 VIEWS),BILAT(CPT=73523); Future      No orders of the defined types were placed in this encounter.      Meds This Visit:  Requested Prescriptions      No prescriptions requested or ordered in this encounter       Imaging & Referrals:  None         ID#[id number]

## 2024-04-18 ENCOUNTER — APPOINTMENT (OUTPATIENT)
Dept: PHYSICAL THERAPY | Age: 44
End: 2024-04-18
Attending: PHYSICAL MEDICINE & REHABILITATION
Payer: COMMERCIAL

## 2024-04-23 ENCOUNTER — PATIENT MESSAGE (OUTPATIENT)
Dept: FAMILY MEDICINE CLINIC | Facility: CLINIC | Age: 44
End: 2024-04-23

## 2024-04-24 NOTE — TELEPHONE ENCOUNTER
From: Martinez Fernandez  To: Nuzhat Skinner  Sent: 4/23/2024 10:44 AM CDT  Subject: Hello    Hello Doctor,    I want to follow up with you Doctor when do you read the results for my last Hips X-Ray.     Thank you!!    Martinez

## 2024-04-25 ENCOUNTER — APPOINTMENT (OUTPATIENT)
Dept: PHYSICAL THERAPY | Age: 44
End: 2024-04-25
Attending: PHYSICAL MEDICINE & REHABILITATION
Payer: COMMERCIAL

## 2024-05-01 ENCOUNTER — PATIENT MESSAGE (OUTPATIENT)
Dept: FAMILY MEDICINE CLINIC | Facility: CLINIC | Age: 44
End: 2024-05-01

## 2024-05-01 DIAGNOSIS — M25.521 RIGHT ELBOW PAIN: ICD-10-CM

## 2024-05-01 DIAGNOSIS — M79.601 PAIN IN RIGHT ARM: Primary | ICD-10-CM

## 2024-05-01 NOTE — TELEPHONE ENCOUNTER
Referral signed  Please fax referral   Advise patient to return to office if pain worsening/not improving with physical therapy

## 2024-05-01 NOTE — TELEPHONE ENCOUNTER
From: Martinez Fernandez  To: Nuzhat Skinner  Sent: 5/1/2024 2:32 PM CDT  Subject: Physical Therapy Request    Hello Doctor,    I want to request you the Script or referral for physical Therapy for my right arm for which I have seen you recently and you mentioned it’s tendinitis.     Please consider my request and send the script to [facility] in [place] fax number [phone].    I appreciate. Thank you     Martinez Fernandez

## 2024-05-02 ENCOUNTER — APPOINTMENT (OUTPATIENT)
Dept: PHYSICAL THERAPY | Age: 44
End: 2024-05-02
Attending: PHYSICAL MEDICINE & REHABILITATION

## 2024-05-02 NOTE — TELEPHONE ENCOUNTER
Hello  This patients referral# [id number] will be closed as duplicate.   Patient has referral# [id number] that has been approved for any [facility] location.    This authorization is still valid until 6/22/24 for a total of 8 approved visits.    If patient has used all of the approved 8 visits we will need clinical from [facility] to submit to [facility] for review for continuation of care.   Please reach out if patient has used all visits.   Thank you  Maren     Approved referral below:

## 2024-05-09 ENCOUNTER — APPOINTMENT (OUTPATIENT)
Dept: PHYSICAL THERAPY | Age: 44
End: 2024-05-09
Attending: PHYSICAL MEDICINE & REHABILITATION

## 2024-05-10 ENCOUNTER — NURSE TRIAGE (OUTPATIENT)
Dept: FAMILY MEDICINE CLINIC | Facility: CLINIC | Age: 44
End: 2024-05-10

## 2024-05-10 RX ORDER — CETIRIZINE HYDROCHLORIDE 10 MG/1
10 TABLET ORAL DAILY
Qty: 30 TABLET | Refills: 1 | Status: SHIPPED | OUTPATIENT
Start: 2024-05-10

## 2024-05-10 NOTE — TELEPHONE ENCOUNTER
Refill Request for medication(s): cetirizine 10 MG Oral Tab     Last Office Visit: 7/2023    Last Refill:    Pharmacy, Dosage verified:     Condition Update (if applicable): msg sent    Rx pended and sent to provider for approval, please advise. Thank You!

## 2024-05-10 NOTE — TELEPHONE ENCOUNTER
Patient called. She complains of muscle tightness/pain at Left side of hip, can feel some muscle stretching/tightness.  Hurts to walk. Patient took ibuprofen but doesn't want to take daily.     Patient Has Ortho appointment 5/23/24. Patient advised to do some hip stretching exercises. Can due heat pack to help; can do topical analgesic cream and gentle massage to help relieve tension.     Patient verbalized understanding.       Reason for Disposition   Hip pain    Protocols used: Hip Pain-A-OH

## 2024-05-13 ENCOUNTER — PATIENT MESSAGE (OUTPATIENT)
Dept: FAMILY MEDICINE CLINIC | Facility: CLINIC | Age: 44
End: 2024-05-13

## 2024-05-13 DIAGNOSIS — M25.551 BILATERAL HIP PAIN: Primary | ICD-10-CM

## 2024-05-13 DIAGNOSIS — M25.552 BILATERAL HIP PAIN: Primary | ICD-10-CM

## 2024-05-14 NOTE — TELEPHONE ENCOUNTER
Routed to Nuzhat ECKERT for advise, thanks.  Do you want patient to see Ortho or OK to start PT? Order pended.   Last office visit 4-17-24.      Future Appointments   Date Time Provider Department Center   5/23/2024  1:00 PM Ananda Diane MD [facility]

## 2024-05-15 NOTE — TELEPHONE ENCOUNTER
Nuzhat Skinner =see Kim's note below, the physical therapy referral that was ordered on 5/1/24 is for the right arm and elbow pain and  current status as \"CLOSED .\"

## 2024-05-17 ENCOUNTER — TELEPHONE (OUTPATIENT)
Dept: FAMILY MEDICINE CLINIC | Facility: CLINIC | Age: 44
End: 2024-05-17

## 2024-05-17 NOTE — TELEPHONE ENCOUNTER
Patient would like order for physical therapy to be faxed to [facility] in [place] at # [phone]. Please advise

## 2024-05-19 NOTE — TELEPHONE ENCOUNTER
Patient has viewed my chart message    [facility] User Last Read On   Martinez Fernandez 5/18/2024  1:47 AM

## 2024-05-23 ENCOUNTER — OFFICE VISIT (OUTPATIENT)
Dept: ORTHOPEDICS CLINIC | Facility: CLINIC | Age: 44
End: 2024-05-23

## 2024-05-23 VITALS — BODY MASS INDEX: 27.99 KG/M2 | WEIGHT: 168 LBS | HEIGHT: 65 IN

## 2024-05-23 DIAGNOSIS — M54.16 LUMBAR RADICULOPATHY: Primary | ICD-10-CM

## 2024-05-23 PROCEDURE — 3008F BODY MASS INDEX DOCD: CPT | Performed by: ORTHOPAEDIC SURGERY

## 2024-05-23 PROCEDURE — 99204 OFFICE O/P NEW MOD 45 MIN: CPT | Performed by: ORTHOPAEDIC SURGERY

## 2024-05-23 NOTE — PROGRESS NOTES
NURSING INTAKE COMMENTS:   Chief Complaint   Patient presents with    Consult     Consult - B/l hip -  Pt states pain a couple of weeks ago. She is PT, states it is going well. Pain is on and off. Pt states pain is possibly from a fall in November, but not sure.        HPI: This 43 year old female presents today with complaints of left hip pain.  She has had pain in the posterior lateral aspect of the left hip for the past couple of weeks.  She feels the symptoms may have started after an aggressive workout at her gym.  She has been doing a lot of gluteal strengthening.  She is also had some tendinitis type pain involving the right forearm.  She started some physical therapy recently and notes slight improvement.  She has some discomfort extending from the posterolateral hip and thigh and of the left calf area.  She has occasional back pain.  She reports use of ibuprofen with some improvement.  No significant groin or anterior thigh pain.  No mechanical clicking or popping in the hip.  No significant night pain.  She has no significant problems with prolonged walking on a treadmill.    History reviewed. No pertinent past medical history.  Past Surgical History:   Procedure Laterality Date          had her baby     Current Outpatient Medications   Medication Sig Dispense Refill    cetirizine 10 MG Oral Tab Take 1 tablet (10 mg total) by mouth daily. 30 tablet 1    Multiple Vitamin (MULTIVITAMIN ADULT) Oral Tab Take 1 tablet by mouth daily. With iron 1 tablet 0    Cholecalciferol (VITAMIN D) 1000 units Oral Tab Take by mouth.       No Known Allergies  Family History   Problem Relation Age of Onset    Other (Other) Mother         goiter    Stroke Neg     Cancer Neg     Hypertension Neg     Diabetes Neg     Heart Disorder Neg     Breast Cancer Neg     Ovarian Cancer Neg        Social History     Occupational History    Not on file   Tobacco Use    Smoking status: Never    Smokeless tobacco: Never    Tobacco  comments:     no tabbaco   Vaping Use    Vaping status: Never Used   Substance and Sexual Activity    Alcohol use: Not Currently     Alcohol/week: 1.0 standard drink of alcohol     Types: 1 Standard drinks or equivalent per week     Comment: occ    Drug use: No    Sexual activity: Yes     Partners: Male     Birth control/protection: Condom        Review of Systems:  GENERAL: denies fevers, chills, night sweats, fatigue, unintentional weight loss/gain  SKIN: denies skin lesions, open sores, rash  HEENT:denies recent vision change, new nasal congestion,hearing loss, tinnitus, sore throat, headaches  RESPIRATORY: denies new shortness of breath, cough, asthma, wheezing  CARDIOVASCULAR: denies chest pain, leg cramps with exertion, palpitations, leg swelling  GI: denies abdominal pain, nausea, vomiting, diarrhea, constipation, hematochezia, worsening heartburn or stomach ulcers  : denies dysuria, hematuria, incontinence, increased frequency, urgency, difficulty urinating  MUSCULOSKELETAL: denies musculoskeletal complaints other than in HPI  NEURO: denies numbness, tingling, weakness, balance issues, dizziness, memory loss  PSYCHIATRIC: denies Hx of depression, anxiety, other psychiatric disorders  HEMATOLOGIC: denies blood clots, anemia, blood clotting disorders, blood transfusion  ENDOCRINE: denies autoimmune disease, thyroid issues, or diabetes  ALLERGY: denies asthma, seasonal allergies    Physical Examination:    Ht 5' 5\" (1.651 m)   Wt 168 lb (76.2 kg)   LMP 04/01/2024   BMI 27.96 kg/m²   Constitutional: appears well hydrated, alert and responsive, no acute distress noted  Extremities: She walks without a limp.  Further exam left hip reveals no tenderness over the greater trochanter.  She has no pain with active straight leg raising.  Minimal pain in the posterior hip with passive straight leg raising.  Passive flexion 100 degrees produces no pain in the hip.  Passive internal rotation to 40 degrees produces no  pain.  Passive external rotation to 80 degrees produces no pain.  Sciatic notch minimally tender to palpation.  SI joint nontender.  Lumbar spine nontender.  No significant paraspinal spasm.  Neurological: Light touch and pinprick sensation intact throughout the lower extremities.  Ankle dorsiflexion plantarflexion EHL knee extension and hip flexion strength are 5 out of 5 bilaterally.  No clonus.    Imaging:   X-rays of the left hip show no significant degenerative change.  There is a calcification along the lateral acetabulum on the left.  There is a positive crossover sign.    Labs:  Lab Results   Component Value Date    WBC 6.7 06/26/2023    HGB 12.6 06/26/2023    .0 06/26/2023      Lab Results   Component Value Date     (H) 11/15/2023    BUN 8 (L) 11/15/2023    CREATSERUM 0.81 11/15/2023    GFRNAA 105 09/26/2021    GFRAA 121 09/26/2021        Assessment and Plan:  Diagnoses and all orders for this visit:    Lumbar radiculopathy  -     Physiatry Referral - [facility])  -     PHYSICAL THERAPY - INTERNAL        Assessment: Left hip pain, findings consistent with lumbar radiculopathy    Plan: I recommend nonsurgical treatment of her condition.  Recommended a physiatry evaluation.  Provided referral for outpatient physical therapy focused on lumbar stabilization and hip stretching.  I feel the calcification on her x-ray could relate to labral pathology but she has no symptoms of intrinsic hip pathology.  Follow-up with me again as needed.  Advised follow-up with physiatry if symptoms persist beyond the next for 5 weeks.    Follow Up: Return if symptoms worsen or fail to improve.    SARAH BONILLA MD

## 2024-05-29 ENCOUNTER — MED REC SCAN ONLY (OUTPATIENT)
Dept: FAMILY MEDICINE CLINIC | Facility: CLINIC | Age: 44
End: 2024-05-29

## 2024-06-03 ENCOUNTER — TELEPHONE (OUTPATIENT)
Dept: FAMILY MEDICINE CLINIC | Facility: CLINIC | Age: 44
End: 2024-06-03

## 2024-06-03 NOTE — TELEPHONE ENCOUNTER
Please see TE from 5/1.     Per patient, in the beginning of May [facility] was treated her right arm pain not her neck pain. At this point in her treatment patient had completed the treatment given to her by Dr. Long for her neck(referral # [id number] ). Patient is not able to continue using the same referral due to the reason for continuing visits being different.     Per patient, referral from 5/1 from ANAMIKA Skinner (referral # [id number])  to treat the pain in her right arm needs to be authorized for [facility]. Per patient, therapy was completed one week ago for her arm.     Patient has now completed therapy for her right arm and will be proceeding with therapy for her hip(referral # [id number]). Patient will continue to get therapy at [facility] and is requesting to have referral updated for [facility].

## 2024-06-07 ENCOUNTER — MED REC SCAN ONLY (OUTPATIENT)
Dept: FAMILY MEDICINE CLINIC | Facility: CLINIC | Age: 44
End: 2024-06-07

## 2024-06-20 ENCOUNTER — TELEPHONE (OUTPATIENT)
Dept: PODIATRY CLINIC | Facility: CLINIC | Age: 44
End: 2024-06-20

## 2024-06-20 NOTE — TELEPHONE ENCOUNTER
Patient is a patient of . She states she is in a lot of pain and is requesting to be seen. Patient is aware Dr. Johnson is covering 's patient's while she is out of office. Next available is not until 7/16 and patient declined appointments with both doctors. See patient message from 6/18. Please call.

## 2024-06-21 NOTE — TELEPHONE ENCOUNTER
Called patient and she was last seen by Dr Martinez on 12/4/23 for bilateral foot pain and she states the pain returned. She denies any injury. Advised Dr Martinez is booked until August, if she would like to see another provider can offer 7/3 at 930am with Dr Johnson, address given. She accepted and added to wait list. She had no further concerns.

## 2024-07-03 ENCOUNTER — OFFICE VISIT (OUTPATIENT)
Facility: LOCATION | Age: 44
End: 2024-07-03
Payer: COMMERCIAL

## 2024-07-03 DIAGNOSIS — M72.2 BILATERAL PLANTAR FASCIITIS: Primary | ICD-10-CM

## 2024-07-03 DIAGNOSIS — M62.461 GASTROCNEMIUS EQUINUS OF RIGHT LOWER EXTREMITY: ICD-10-CM

## 2024-07-03 DIAGNOSIS — M79.671 HEEL PAIN, BILATERAL: ICD-10-CM

## 2024-07-03 DIAGNOSIS — M79.672 HEEL PAIN, BILATERAL: ICD-10-CM

## 2024-07-03 DIAGNOSIS — M62.462 GASTROCNEMIUS EQUINUS OF LEFT LOWER EXTREMITY: ICD-10-CM

## 2024-07-03 PROCEDURE — 99213 OFFICE O/P EST LOW 20 MIN: CPT | Performed by: PODIATRIST

## 2024-07-03 RX ORDER — PREDNISONE 10 MG/1
10 TABLET ORAL 2 TIMES DAILY
Qty: 28 TABLET | Refills: 0 | Status: SHIPPED | OUTPATIENT
Start: 2024-07-03 | End: 2024-07-17

## 2024-07-03 NOTE — PROGRESS NOTES
[facility]  Progress Note    Martinez Fernandez is a 43 year old female.   Chief Complaint   Patient presents with    Heel Pain     F/u bilateral heel plantar fasciitis  on and off pain 0-7/10 worse in the morning. Exercises relieves pain. LOV with Dr Ruelas  was on 23         HPI:     Patient is a pleasant 43-year-old female who is presenting to clinic today with complaints of bilateral heel pain.  Patient does have a history of plantars fasciitis to both of her heels.  She has seen Dr. Freitas in the past and has been doing physical therapy and exercises.  Patient does admit that she has slacked on her exercises and has noticed increasing pain (right worse than left).  She states that the only time she has pain is first thing in the morning, which improves when she starts walking.  Rates the pain anywhere from 0-7/10.  Denies any recent injuries.  She is in dress shoes today, but does have over-the-counter inserts in them.  She is here today for further evaluation and care.  Denies recent nausea, vomiting, fevers, chills.      Allergies: Patient has no known allergies.   Current Outpatient Medications   Medication Sig Dispense Refill    cetirizine 10 MG Oral Tab Take 1 tablet (10 mg total) by mouth daily. 30 tablet 1    Multiple Vitamin (MULTIVITAMIN ADULT) Oral Tab Take 1 tablet by mouth daily. With iron 1 tablet 0    Cholecalciferol (VITAMIN D) 1000 units Oral Tab Take by mouth.        No past medical history on file.   Past Surgical History:   Procedure Laterality Date          had her baby      Family History   Problem Relation Age of Onset    Other (Other) Mother         goiter    Stroke Neg     Cancer Neg     Hypertension Neg     Diabetes Neg     Heart Disorder Neg     Breast Cancer Neg     Ovarian Cancer Neg       Social History     Socioeconomic History    Marital status: Single   Tobacco Use    Smoking status: Never    Smokeless tobacco: Never    Tobacco comments:     no  tabbaco   Vaping Use    Vaping status: Never Used   Substance and Sexual Activity    Alcohol use: Not Currently     Alcohol/week: 1.0 standard drink of alcohol     Types: 1 Standard drinks or equivalent per week     Comment: occ    Drug use: No    Sexual activity: Yes     Partners: Male     Birth control/protection: Condom   Other Topics Concern    Reaction to local anesthetic No    Pt has a pacemaker No    Pt has a defibrillator No    Caffeine Concern No    Exercise Yes           REVIEW OF SYSTEMS:     10 point ROS completed and was negative, except for pertinent positive and negatives stated in subjective.       EXAM:     GENERAL: well developed, well nourished, in no apparent distress  EXTREMITIES:  1. Integument: Skin appears moist, warm, and supple with positive hair growth. There are no color changes. No open lesions. No macerations. No Hyperkeratotic lesions.   2. Vascular: Dorsalis pedis 2/4 bilateral and posterior tibial pulses 2/4 bilateral, capillary refill normal.  3. Neurological: Gross sensation intact via light touch bilaterally.  Normal sharp/dull sensation  4. Musculoskeletal: Pain with palpation to plantar medial aspect of bilateral heels.  No pain with side-to-side heel squeeze or pain with palpation of posterior heels, bilaterally.  Patient does have decreased ankle joint dorsiflexion with the knee extended, which does improve with the knee flexed consistent with equinus deformity bilateral lower extremities.  All muscle groups are graded 5/5 in the foot and ankle.       ASSESSMENT AND PLAN:   Diagnoses and all orders for this visit:    Bilateral plantar fasciitis  -     Physical Therapy Referral - External    Gastrocnemius equinus of right lower extremity  -     Physical Therapy Referral - External    Gastrocnemius equinus of left lower extremity  -     Physical Therapy Referral - External    Heel pain, bilateral  -     Physical Therapy Referral - External        Plan:   -Patient was seen and  evaluated today in clinic.  Chart history reviewed.    -I have reviewed patient's chart, clinical findings and diagnosis related to condition with the patient in detail.  -Basic mechanical principles reviewed. Discussed potential etiology and treatment options.  -Explained that the plantar fascia is a connective tissue/ligament on the bottom of the foot.   -This is an overuse injury and progress is often slow/gradual.   -Discussed importance of managing the inflammation and biomechanical issues as well as the importance of adhering to the conservative treatment instructions given.  -Pt educated and given a handout on proper footwear and importance of supportive shoes.   -Pt demonstrated and given handout with recommended home stretching routine.    -I have recommended OTC supplemental arch supports such as Spenco/Powerstep/Redithotics prefab orthotics.  Can look into custom orthotic options in the future.  -We discussed options for OTC vs. Prescriptions NSAID's and GI precautions reviewed.  -Recommendations for ice/gel pack to affected area 2-3 times daily and especially after activity or when symptoms are most prevalent.   -Modify activity according to tolerance of pain/symptoms.  -Additionally, cortisone injections, physical therapy, and immobilization can be considered.  -Surgical intervention may be considered if all conservative measures fail to resolve patient's symptoms.  Injections series: Deferred today.  -Patient elects for formal physical therapy.  Referral was placed today.  -Rx: Prednisone 10 mg twice daily for 14 days    -The patient indicates understanding of these issues and agrees to the plan.    Time spent reviewing pertinent information from patient's chart, reviewing any pertinent imaging, obtaining history and physical exam, discussing and mutually agreeing on a treatment plan, and documenting encounter: 25 minutes    RTC 6-8 weeks with me or Dr. Zena Johnson DPM         7/3/2024    Dragon speech recognition software was used to prepare this note.  Errors in word recognition may occur.  Please contact me with any questions/concerns with this note.

## 2024-07-03 NOTE — TELEPHONE ENCOUNTER
Spoke to pt. Provided office fax number of [phone]. Advised pt call [facility] to have them fax over progress notes from last visits. Be on the lookout for scanned Media once received.  Thank you.

## 2024-07-03 NOTE — TELEPHONE ENCOUNTER
Hello     We will need a progress note from [facility] for additional therapy.    Thank you,  [place]  Referral specialist

## 2024-07-17 NOTE — TELEPHONE ENCOUNTER
1st attempt - LMTCB patient insurance is assign to Group# [id number]  [employer] Name  [employer]  Address  [address]    [place] [address]  Telephone  [phone]    Please tell patient if she would like to be assign to [place], she needs to call her insurance and change her Group# [id number] Group Name P and chose Dr Akbar and her PCP.  
CSS, please update Pt's insurance coverage.  See media tab.  Thank you.  Per [facility]ini appointment notes: \"I want to get the Xray's if required and need to discuss about tingling in palms at night.\"   
[facility] message sent to pt, await reply.   Referral pended, we'll need Dx.     Future Appointments   Date Time Provider Department Center   1/31/2024  2:00 PM [id number]   3/19/2024 11:40 AM [facility]         
Mammogram ordered per department process.     
Patient returning call, informed group needs to be updated, stated will call back once has been updated with insurance.   
Referral has been pended please approve if appropriate  
Referrals placed per patient request   
noted  
Detail Level: Detailed
Wound Evaluated By: Dr Haley ARVIZU

## 2024-07-30 ENCOUNTER — TELEPHONE (OUTPATIENT)
Dept: FAMILY MEDICINE CLINIC | Facility: CLINIC | Age: 44
End: 2024-07-30

## 2024-07-30 NOTE — TELEPHONE ENCOUNTER
Patient is requesting one order for physical therapy for hip and heel. Is wondering if it is ok with insurance to have one order or two. Physical therapist is saying that they can not do two different therapies at the same time. Order to be faxed to yue who is the physical therapist   Fax number :

## 2024-09-24 ENCOUNTER — NURSE TRIAGE (OUTPATIENT)
Dept: FAMILY MEDICINE CLINIC | Facility: CLINIC | Age: 44
End: 2024-09-24

## 2024-09-24 NOTE — TELEPHONE ENCOUNTER
Action Requested: Summary for Provider     []  Critical Lab, Recommendations Needed  [] Need Additional Advice  []   FYI    []   Need Orders  [] Need Medications Sent to Pharmacy  []  Other     SUMMARY: Home care. Advised to call back if symptoms return or worsen.     Reason for call: Sore Throat  Onset: Data Unavailable    Patient ate something spicy two days ago. Yesterday she had a sore throat. She did home care and it is helping. Her symptoms have improved but her throat is a little scratchy. She is negative for Covid.     Reason for Disposition   Sore throat    Protocols used: Sore Throat-A-OH

## 2024-09-26 ENCOUNTER — PATIENT MESSAGE (OUTPATIENT)
Dept: FAMILY MEDICINE CLINIC | Facility: CLINIC | Age: 44
End: 2024-09-26

## 2024-09-27 NOTE — TELEPHONE ENCOUNTER
Margie Fields RN 9/27/2024 9:10 AM CDT        ----- Message -----  From: Martinez Fernandez  Sent: 9/26/2024 1:22 PM CDT  To: [facility]  Subject: Hello     Hello Doctor,    I spoke with the nurse on 09/24 regarding my health and few symptoms which started on Monday evening with scratchy sore throat after eating some spicy snack, this got better but started mild cough and running nose now. When I sneeze, I can see the mucus and since yesterday I'm taking tellenol cold and flu meds. Do i need to worry Abt this cold or do I need the antibiotics. Please advise me.    Thank you

## 2024-10-01 ENCOUNTER — MED REC SCAN ONLY (OUTPATIENT)
Facility: LOCATION | Age: 44
End: 2024-10-01

## 2024-10-03 NOTE — TELEPHONE ENCOUNTER
Patient called back stated    Has lots of congestion with clear mucous    Has been doing humidified air with some relief    Advised Neti pot and nasal spray.    No further questions

## 2024-10-23 ENCOUNTER — OFFICE VISIT (OUTPATIENT)
Dept: FAMILY MEDICINE CLINIC | Facility: CLINIC | Age: 44
End: 2024-10-23
Payer: COMMERCIAL

## 2024-10-23 VITALS
DIASTOLIC BLOOD PRESSURE: 75 MMHG | BODY MASS INDEX: 28.82 KG/M2 | TEMPERATURE: 97 F | HEART RATE: 53 BPM | HEIGHT: 65 IN | WEIGHT: 173 LBS | SYSTOLIC BLOOD PRESSURE: 119 MMHG

## 2024-10-23 DIAGNOSIS — Z00.00 WELL ADULT EXAM: Primary | ICD-10-CM

## 2024-10-23 DIAGNOSIS — E66.3 OVERWEIGHT (BMI 25.0-29.9): ICD-10-CM

## 2024-10-23 NOTE — PROGRESS NOTES
HPI:    Patient ID: Martinez Fernandez is a 44 year old female.    HPI  Chief Complaint   Patient presents with    Well Adult     Sees gyne          Wt Readings from Last 6 Encounters:   10/23/24 173 lb (78.5 kg)   05/23/24 168 lb (76.2 kg)   04/17/24 169 lb (76.7 kg)   02/09/24 171 lb (77.6 kg)   10/17/23 171 lb (77.6 kg)   08/09/23 173 lb 6.4 oz (78.7 kg)     BP Readings from Last 3 Encounters:   10/23/24 119/75   04/17/24 108/69   10/17/23 108/69     Daughter is 14 yrs .  Has a partner.  Wants a .    Neck pain better.      Review of Systems   Constitutional:  Negative for activity change, appetite change, chills, fatigue, fever and unexpected weight change.   HENT:  Negative for congestion, dental problem, drooling, ear discharge, ear pain, facial swelling, hearing loss, mouth sores, nosebleeds, postnasal drip, rhinorrhea, sinus pressure, sinus pain, sneezing, sore throat, tinnitus, trouble swallowing and voice change.    Eyes:  Negative for pain, discharge, redness and visual disturbance.   Respiratory:  Negative for cough, shortness of breath and wheezing.    Cardiovascular:  Negative for chest pain, palpitations and leg swelling.   Gastrointestinal:  Negative for abdominal pain, anal bleeding, blood in stool, constipation, diarrhea, nausea, rectal pain and vomiting.        Sometimes has bloating   Endocrine: Negative for cold intolerance, heat intolerance, polydipsia, polyphagia and polyuria.   Genitourinary:  Negative for decreased urine volume, difficulty urinating, dysuria, flank pain, frequency, menstrual problem, pelvic pain, urgency, vaginal bleeding, vaginal discharge and vaginal pain.        Periods are regular     Musculoskeletal:  Negative for arthralgias, back pain and myalgias.   Skin:  Negative for rash.   Neurological:  Negative for dizziness, seizures, syncope, weakness, numbness and headaches.   Hematological:  Does not bruise/bleed easily.   Psychiatric/Behavioral:  Negative for  behavioral problems, decreased concentration, self-injury, sleep disturbance and suicidal ideas. The patient is not nervous/anxious.        /75   Pulse 53   Temp 97.2 °F (36.2 °C) (Temporal)   Ht 5' 5\" (1.651 m)   Wt 173 lb (78.5 kg)   LMP 10/16/2024 (Approximate)   BMI 28.79 kg/m²     History reviewed. No pertinent past medical history.  Past Surgical History:   Procedure Laterality Date          had her baby     Social History     Socioeconomic History    Marital status: Single     Spouse name: Not on file    Number of children: Not on file    Years of education: Not on file    Highest education level: Not on file   Occupational History    Not on file   Tobacco Use    Smoking status: Never    Smokeless tobacco: Never    Tobacco comments:     no tabbaco   Vaping Use    Vaping status: Never Used   Substance and Sexual Activity    Alcohol use: Not Currently     Alcohol/week: 1.0 standard drink of alcohol     Types: 1 Standard drinks or equivalent per week     Comment: occ    Drug use: No    Sexual activity: Yes     Partners: Male     Birth control/protection: Condom   Other Topics Concern    Grew up on a farm Not Asked    History of tanning Not Asked    Outdoor occupation Not Asked    Breast feeding Not Asked    Reaction to local anesthetic No    Pt has a pacemaker No    Pt has a defibrillator No    Caffeine Concern No    Exercise Yes    Seat Belt Not Asked    Special Diet Not Asked    Stress Concern Not Asked    Weight Concern Not Asked   Social History Narrative    Not on file     Social Drivers of Health     Financial Resource Strain: Not on file   Food Insecurity: Not on file   Transportation Needs: Not on file   Physical Activity: Not on file   Stress: Not on file   Social Connections: Not on file   Housing Stability: Not on file     Family History   Problem Relation Age of Onset    Other (Other) Mother         goiter    Stroke Neg     Cancer Neg     Hypertension Neg     Diabetes Neg      Heart Disorder Neg     Breast Cancer Neg     Ovarian Cancer Neg        Immunization History   Administered Date(s) Administered    Covid-19 Vaccine 360imaging (J&J) 0.5ml 03/23/2021    Covid-19 Vaccine Moderna 50 Mcg/0.25 Ml 12/10/2021    FLUZONE 6 months and older PFS 0.5 ml (90591) 12/01/2022    Flucelvax 0.5 Ml Quad PFS Single Dose 10/18/2020    Flucelvax Influenza vaccine, trivalent (ccIIV3), 0.5mL IM 10/18/2020, 10/18/2020    Influenza Vaccine, trivalent (IIV3), PF 0.5mL (90296) 10/08/2024    TDAP 10/17/2023       Health Maintenance   Topic Date Due    COVID-19 Vaccine (3 - 2023-24 season) 09/01/2024    Annual Physical  10/17/2024    Mammogram  02/21/2025    Pap Smear  08/09/2026    DTaP,Tdap,and Td Vaccines (2 - Td or Tdap) 10/17/2033    Influenza Vaccine  Completed    Annual Depression Screening  Completed    Pneumococcal Vaccine: Birth to 64yrs  Aged Out          Current Outpatient Medications   Medication Sig Dispense Refill    Multiple Vitamin (MULTIVITAMIN ADULT) Oral Tab Take 1 tablet by mouth daily. With iron 1 tablet 0    Cholecalciferol (VITAMIN D) 1000 units Oral Tab Take by mouth.      cetirizine 10 MG Oral Tab Take 1 tablet (10 mg total) by mouth daily. (Patient not taking: Reported on 10/23/2024) 30 tablet 1     Allergies:Allergies[1]   PHYSICAL EXAM:     Chief Complaint   Patient presents with    Well Adult     Sees gyne         Physical Exam  Vitals and nursing note reviewed.   Constitutional:       Appearance: She is well-developed.   HENT:      Head: Normocephalic and atraumatic.      Right Ear: External ear normal.      Left Ear: External ear normal.      Nose: Nose normal.      Mouth/Throat:      Pharynx: No oropharyngeal exudate.   Eyes:      General:         Right eye: No discharge.         Left eye: No discharge.      Conjunctiva/sclera: Conjunctivae normal.      Pupils: Pupils are equal, round, and reactive to light.   Neck:      Thyroid: No thyromegaly.   Cardiovascular:      Rate and  Rhythm: Normal rate and regular rhythm.      Heart sounds: Normal heart sounds. No murmur heard.  Pulmonary:      Effort: Pulmonary effort is normal.      Breath sounds: Normal breath sounds. No wheezing.   Abdominal:      General: Bowel sounds are normal.      Palpations: Abdomen is soft. There is no mass.      Tenderness: There is no abdominal tenderness.   Musculoskeletal:         General: No tenderness.      Cervical back: Normal range of motion and neck supple.   Lymphadenopathy:      Cervical: No cervical adenopathy.   Skin:     General: Skin is dry.      Findings: No rash.   Neurological:      Mental Status: She is alert and oriented to person, place, and time.      Cranial Nerves: No cranial nerve deficit.      Motor: No abnormal muscle tone.      Coordination: Coordination normal.      Deep Tendon Reflexes: Reflexes are normal and symmetric. Reflexes normal.   Psychiatric:         Behavior: Behavior normal.         Thought Content: Thought content normal.         Judgment: Judgment normal.                ASSESSMENT/PLAN:     Return yearly for physicals  Follow up with dentist every 6 months  Follow up with eye doctor yearly  Recommend aerobic exercise for at least 30mins 5 days a week  Yearly flu shot  Tetanus booster every 10 years (Tdap/ Td)  Labs ordered/ or reviewed if done prior to appointment     Encounter Diagnoses   Name Primary?    Well adult exam Yes    Overweight (BMI 25.0-29.9)        1. Well adult exam    - CBC With Differential With Platelet; Future  - Comp Metabolic Panel (14); Future  - Lipid Panel; Future  - TSH W Reflex To Free T4; Future  - Hemoglobin A1C; Future    2. Overweight (BMI 25.0-29.9)  Wt Readings from Last 6 Encounters:   10/23/24 173 lb (78.5 kg)   05/23/24 168 lb (76.2 kg)   04/17/24 169 lb (76.7 kg)   02/09/24 171 lb (77.6 kg)   10/17/23 171 lb (77.6 kg)   08/09/23 173 lb 6.4 oz (78.7 kg)       Highly recommend to lose weight.  Discussed good dietary and eating habits as  well as increasing vegetable and fruit intake.  Recommending avoiding foods high in fat content.  Recommend exercising at least 30-40 minutes 5-6 days a week.  Avoid skipping meals.  Making healthy choices for snacks and also limiting sugary beverages.        Orders Placed This Encounter   Procedures    CBC With Differential With Platelet    Comp Metabolic Panel (14)    Lipid Panel    TSH W Reflex To Free T4    Hemoglobin A1C       The above note was creating using Dragon speech recognition technology. Please excuse any typos    Meds This Visit:  Requested Prescriptions      No prescriptions requested or ordered in this encounter       Imaging & Referrals:  None       ID#[id number]       [1] No Known Allergies

## 2024-11-05 ENCOUNTER — PATIENT MESSAGE (OUTPATIENT)
Dept: FAMILY MEDICINE CLINIC | Facility: CLINIC | Age: 44
End: 2024-11-05

## 2024-11-06 NOTE — TELEPHONE ENCOUNTER
Patient called.     #1) Patient complains of symptoms for last few days: recently she felt \"goose bumps\" tingling on right hip/thigh. When sleeping, or sitting, every 15 minutes, or not as constant.   Doesn't feel symptoms when walking.     Advised appointment to discuss new symptoms. Patient declines and would like further recommendation from provider.     #2) request PT for left hip pain. Therapist has not sent her home exercises. Patient requests PT at [facility].

## 2024-11-07 ENCOUNTER — TELEPHONE (OUTPATIENT)
Dept: FAMILY MEDICINE CLINIC | Facility: CLINIC | Age: 44
End: 2024-11-07

## 2024-11-07 NOTE — TELEPHONE ENCOUNTER
[See [facility] message from 11/5/24 with Dr. Akbar's recommendation for visit]    I left detailed message on verbal release verified # [phone] Patient contacted and made aware of Dr. Akbar's recommendation; also making aware of [facility] message being sent with recommendations. [1st attempt]    RN Triage - please check if patient read [facility] message and called back, if not please make 2nd attempt to call to assess and advise visit

## 2024-11-07 NOTE — TELEPHONE ENCOUNTER
Patient called back. Appointment made.        Received report from KALEIGH Agrawal. Social work and hospice involved. Pt family is involve with care. Safety measures in place, care assumed.

## 2024-11-13 ENCOUNTER — OFFICE VISIT (OUTPATIENT)
Dept: FAMILY MEDICINE CLINIC | Facility: CLINIC | Age: 44
End: 2024-11-13
Payer: COMMERCIAL

## 2024-11-13 VITALS
DIASTOLIC BLOOD PRESSURE: 77 MMHG | HEIGHT: 65 IN | BODY MASS INDEX: 28.99 KG/M2 | HEART RATE: 56 BPM | WEIGHT: 174 LBS | SYSTOLIC BLOOD PRESSURE: 128 MMHG

## 2024-11-13 DIAGNOSIS — R20.2 RIGHT LEG PARESTHESIAS: Primary | ICD-10-CM

## 2024-11-13 DIAGNOSIS — M54.16 LUMBAR RADICULOPATHY: ICD-10-CM

## 2024-11-13 PROCEDURE — 99213 OFFICE O/P EST LOW 20 MIN: CPT | Performed by: FAMILY MEDICINE

## 2024-11-13 PROCEDURE — 3078F DIAST BP <80 MM HG: CPT | Performed by: FAMILY MEDICINE

## 2024-11-13 PROCEDURE — 3074F SYST BP LT 130 MM HG: CPT | Performed by: FAMILY MEDICINE

## 2024-11-13 PROCEDURE — 3008F BODY MASS INDEX DOCD: CPT | Performed by: FAMILY MEDICINE

## 2024-11-13 NOTE — PROGRESS NOTES
HPI:    Patient ID: Martinez Fernandez is a 44 year old female.      Leg Pain   Pertinent negatives include no numbness.       Chief Complaint   Patient presents with    Leg Pain     R thigh tingling X 2 days would like an order for physical therapy for Left hip        Wt Readings from Last 6 Encounters:   11/13/24 174 lb (78.9 kg)   10/23/24 173 lb (78.5 kg)   05/23/24 168 lb (76.2 kg)   04/17/24 169 lb (76.7 kg)   02/09/24 171 lb (77.6 kg)   10/17/23 171 lb (77.6 kg)     BP Readings from Last 3 Encounters:   11/13/24 128/77   10/23/24 119/75   04/17/24 108/69     Has had right thigh/ leg tingling sometimes..  Has happened since 2 weeks  Started gym- treadmill, weights.    Does a sitting job.    Has seen orthopedics in past for hip pain , diagnosis lumbar radiculopathy  Has to drive in to work   Sometimes gets stressed with driving gets simmons pain  Thinking if she can work remote few days     Review of Systems   Musculoskeletal:  Positive for neck pain. Negative for back pain and gait problem.        Chronic neck pain    Neurological:  Negative for weakness and numbness.       /77   Pulse 56   Ht 5' 5\" (1.651 m)   Wt 174 lb (78.9 kg)   LMP 11/03/2024 (Exact Date)   BMI 28.96 kg/m²          Current Outpatient Medications   Medication Sig Dispense Refill    Multiple Vitamin (MULTIVITAMIN ADULT) Oral Tab Take 1 tablet by mouth daily. With iron 1 tablet 0    Cholecalciferol (VITAMIN D) 1000 units Oral Tab Take by mouth.      cetirizine 10 MG Oral Tab Take 1 tablet (10 mg total) by mouth daily. (Patient not taking: Reported on 11/13/2024) 30 tablet 1     Allergies:Allergies[1]   PHYSICAL EXAM:     Chief Complaint   Patient presents with    Leg Pain     R thigh tingling X 2 days would like an order for physical therapy for Left hip       Physical Exam  Vitals reviewed.   Abdominal:      Tenderness: There is no abdominal tenderness.   Musculoskeletal:         General: No swelling, tenderness, deformity or signs of  injury.      Lumbar back: Normal. No swelling, edema, deformity, signs of trauma, lacerations, spasms, tenderness or bony tenderness. Normal range of motion. Negative right straight leg raise test and negative left straight leg raise test. No scoliosis.      Right lower leg: No edema.      Left lower leg: No edema.   Neurological:      Mental Status: She is alert.                ASSESSMENT/PLAN:     Encounter Diagnoses   Name Primary?    Right leg paresthesias Yes    Lumbar radiculopathy        1. Right leg paresthesias  Complete labs  R/o Diabetes mellitus   Complete xray  Suspect lumbar radiculopathy  - Vitamin B12; Future    2. Lumbar radiculopathy    - XR LUMBAR SPINE (MIN 2 VIEWS) (CPT=72100); Future  - Physical Therapy Referral - [place] Locations      Orders Placed This Encounter   Procedures    Vitamin B12         The above note was creating using Dragon speech recognition technology. Please excuse any typos    Meds This Visit:  Requested Prescriptions      No prescriptions requested or ordered in this encounter       Imaging & Referrals:  PHYSICAL THERAPY - INTERNAL  XR LUMBAR SPINE (MIN 2 VIEWS) (CPT=72100)       ID#[id number]       [1] No Known Allergies

## 2024-11-16 ENCOUNTER — HOSPITAL ENCOUNTER (OUTPATIENT)
Dept: GENERAL RADIOLOGY | Age: 44
Discharge: HOME | End: 2024-11-16
Attending: FAMILY MEDICINE
Payer: COMMERCIAL

## 2024-11-16 ENCOUNTER — LAB ENCOUNTER (OUTPATIENT)
Dept: LAB | Age: 44
End: 2024-11-16
Attending: FAMILY MEDICINE
Payer: COMMERCIAL

## 2024-11-16 DIAGNOSIS — R20.2 RIGHT LEG PARESTHESIAS: ICD-10-CM

## 2024-11-16 DIAGNOSIS — R73.9 HYPERGLYCEMIA: ICD-10-CM

## 2024-11-16 DIAGNOSIS — Z00.00 WELL ADULT EXAM: ICD-10-CM

## 2024-11-16 DIAGNOSIS — M54.16 LUMBAR RADICULOPATHY: ICD-10-CM

## 2024-11-16 LAB
ALBUMIN SERPL-MCNC: 4.6 G/DL (ref 3.2–4.8)
ALBUMIN/GLOB SERPL: 1.6 {RATIO} (ref 1–2)
ALP LIVER SERPL-CCNC: 51 U/L
ALT SERPL-CCNC: 22 U/L
ANION GAP SERPL CALC-SCNC: 8 MMOL/L (ref 0–18)
AST SERPL-CCNC: 23 U/L (ref ?–34)
BASOPHILS # BLD AUTO: 0.07 X10(3) UL (ref 0–0.2)
BASOPHILS NFR BLD AUTO: 1 %
BILIRUB SERPL-MCNC: 0.5 MG/DL (ref 0.3–1.2)
BUN BLD-MCNC: 10 MG/DL (ref 9–23)
BUN/CREAT SERPL: 13 (ref 10–20)
CALCIUM BLD-MCNC: 9.7 MG/DL (ref 8.7–10.4)
CHLORIDE SERPL-SCNC: 105 MMOL/L (ref 98–112)
CHOLEST SERPL-MCNC: 164 MG/DL (ref ?–200)
CO2 SERPL-SCNC: 25 MMOL/L (ref 21–32)
CREAT BLD-MCNC: 0.77 MG/DL
DEPRECATED RDW RBC AUTO: 38.9 FL (ref 35.1–46.3)
EGFRCR SERPLBLD CKD-EPI 2021: 98 ML/MIN/1.73M2 (ref 60–?)
EOSINOPHIL # BLD AUTO: 0.08 X10(3) UL (ref 0–0.7)
EOSINOPHIL NFR BLD AUTO: 1.1 %
ERYTHROCYTE [DISTWIDTH] IN BLOOD BY AUTOMATED COUNT: 12.5 % (ref 11–15)
EST. AVERAGE GLUCOSE BLD GHB EST-MCNC: 111 MG/DL (ref 68–126)
FASTING PATIENT LIPID ANSWER: NO
FASTING STATUS PATIENT QL REPORTED: NO
GLOBULIN PLAS-MCNC: 2.9 G/DL (ref 2–3.5)
GLUCOSE BLD-MCNC: 84 MG/DL (ref 70–99)
HBA1C MFR BLD: 5.5 % (ref ?–5.7)
HCT VFR BLD AUTO: 39.8 %
HDLC SERPL-MCNC: 46 MG/DL (ref 40–59)
HGB BLD-MCNC: 13.5 G/DL
IMM GRANULOCYTES # BLD AUTO: 0.02 X10(3) UL (ref 0–1)
IMM GRANULOCYTES NFR BLD: 0.3 %
LDLC SERPL CALC-MCNC: 80 MG/DL (ref ?–100)
LYMPHOCYTES # BLD AUTO: 1.7 X10(3) UL (ref 1–4)
LYMPHOCYTES NFR BLD AUTO: 23.9 %
MCH RBC QN AUTO: 28.7 PG (ref 26–34)
MCHC RBC AUTO-ENTMCNC: 33.9 G/DL (ref 31–37)
MCV RBC AUTO: 84.7 FL
MONOCYTES # BLD AUTO: 0.49 X10(3) UL (ref 0.1–1)
MONOCYTES NFR BLD AUTO: 6.9 %
NEUTROPHILS # BLD AUTO: 4.76 X10 (3) UL (ref 1.5–7.7)
NEUTROPHILS # BLD AUTO: 4.76 X10(3) UL (ref 1.5–7.7)
NEUTROPHILS NFR BLD AUTO: 66.8 %
NONHDLC SERPL-MCNC: 118 MG/DL (ref ?–130)
OSMOLALITY SERPL CALC.SUM OF ELEC: 284 MOSM/KG (ref 275–295)
PLATELET # BLD AUTO: 269 10(3)UL (ref 150–450)
POTASSIUM SERPL-SCNC: 4.1 MMOL/L (ref 3.5–5.1)
PROT SERPL-MCNC: 7.5 G/DL (ref 5.7–8.2)
RBC # BLD AUTO: 4.7 X10(6)UL
SODIUM SERPL-SCNC: 138 MMOL/L (ref 136–145)
TRIGL SERPL-MCNC: 227 MG/DL (ref 30–149)
TSI SER-ACNC: 1.39 UIU/ML (ref 0.55–4.78)
VIT B12 SERPL-MCNC: 631 PG/ML (ref 211–911)
VLDLC SERPL CALC-MCNC: 36 MG/DL (ref 0–30)
WBC # BLD AUTO: 7.1 X10(3) UL (ref 4–11)

## 2024-11-16 PROCEDURE — 84443 ASSAY THYROID STIM HORMONE: CPT

## 2024-11-16 PROCEDURE — 36415 COLL VENOUS BLD VENIPUNCTURE: CPT

## 2024-11-16 PROCEDURE — 82607 VITAMIN B-12: CPT

## 2024-11-16 PROCEDURE — 72100 X-RAY EXAM L-S SPINE 2/3 VWS: CPT | Performed by: FAMILY MEDICINE

## 2024-11-16 PROCEDURE — 80061 LIPID PANEL: CPT

## 2024-11-16 PROCEDURE — 80053 COMPREHEN METABOLIC PANEL: CPT

## 2024-11-16 PROCEDURE — 85025 COMPLETE CBC W/AUTO DIFF WBC: CPT

## 2024-11-16 PROCEDURE — 83036 HEMOGLOBIN GLYCOSYLATED A1C: CPT

## 2024-11-21 NOTE — TELEPHONE ENCOUNTER
Action Requested: Summary for Provider     []  Critical Lab, Recommendations Needed  [] Need Additional Advice  []   FYI    []   Need Orders  [] Need Medications Sent to Pharmacy  []  Other     SUMMARY: Please note Dr. Prosper Perez.  SELVINI  After speaking with her, s ED Provider Note    CHIEF COMPLAINT  Chief Complaint   Patient presents with    Shoulder Pain     Pt reports L shoulder pain that started on back and now radiates to front of shoulder and chest.  PT reports the pain has been occurring x 2 times.    Shortness of Breath     Pt reports episodes of SOB secondary to the pain that make it hard for her to complete daily activities.         EXTERNAL RECORDS REVIEWED  Outpatient Notes patient was seen 4/14/2023 by [facility] for evaluation for a well woman exam she has a history of a molar pregnancy    HPI/ROS  LIMITATION TO HISTORY   Select: : None  OUTSIDE HISTORIAN(S):  none    Araceli Vale is a 30 y.o. female who presents complaining of pain in her left back and shoulder muscles that radiates into the front of her shoulder and chest.  She says this has happened twice.  She also states for the last week she has had some shortness of breath and coughing.  She thinks she might have a cold.  She states that when she has the pain in her shoulder makes it worse for her to breathe.  She denies any swelling to the joint.  She denies any fevers or chills and states that her cough is nonproductive.  She denies any smoking drinking or drug use.  She does not think she is pregnant.  She denies any loss of  strength or swelling to the arm.    PAST MEDICAL HISTORY   has a past medical history of Urinary tract infection.    SURGICAL HISTORY   has a past surgical history that includes dilation and curettage (3/17/2017).    FAMILY HISTORY  Family History   Problem Relation Age of Onset    No Known Problems Mother     No Known Problems Father     No Known Problems Sister     No Known Problems Brother     No Known Problems Maternal Grandmother     No Known Problems Maternal Grandfather     No Known Problems Sister        SOCIAL HISTORY  Social History     Tobacco Use    Smoking status: Never    Smokeless tobacco: Never   Vaping Use    Vaping status: Never Used   Substance and  Sexual Activity    Alcohol use: No    Drug use: No    Sexual activity: Yes     Partners: Male     Birth control/protection: Condom     Comment: .       CURRENT MEDICATIONS  Home Medications    **Home medications have not yet been reviewed for this encounter**         ALLERGIES  No Known Allergies    PHYSICAL EXAM  VITAL SIGNS: /87   Pulse 86   Temp 36.5 °C (97.7 °F) (Temporal)   Resp 16   Wt 79.9 kg (176 lb 2.4 oz)   SpO2 98% Comment: RA  BMI 30.24 kg/m²      Constitutional: Well developed, Well nourished, No acute distress, Non-toxic appearance.   HEENT: Normocephalic, Atraumatic,  external ears normal, pharynx pink,  Mucous  Membranes moist, No rhinorrhea or mucosal edema  Eyes: PERRL, EOMI, Conjunctiva normal, No discharge.   Neck: Normal range of motion, No tenderness, Supple, No stridor.   Lymphatic: No lymphadenopathy    Cardiovascular: Regular Rate and Rhythm, No murmurs,  rubs, or gallops.   Thorax & Lungs: Lungs clear to auscultation bilaterally, No respiratory distress, No wheezes, rhales or rhonchi, No chest wall tenderness.   Abdomen: Bowel sounds normal, Soft, non tender, non distended,  No pulsatile masses., no rebound guarding or peritoneal signs.   Skin: Warm, Dry, No erythema, No rash,   Back:  No CVA tenderness,  No spinal tenderness, bony crepitance step offs or instability.   Extremities: Equal, intact distal pulses, No cyanosis, clubbing or edema,  No tenderness.   Musculoskeletal: Good range of motion in all major joints. No tenderness to palpation or major deformities noted.   Neurologic: Alert & oriented No focal deficits noted.  Psychiatric: Affect normal, Judgment normal, Mood normal.      EKG/LABS  Results for orders placed or performed during the hospital encounter of 11/21/24   EKG    Collection Time: 11/21/24  7:29 AM   Result Value Ref Range    Report       [facility]    Test Date:  2024-11-21  Pt Name:    RENETTATORREY YOUSSEF                  Department: ER  MRN:        [id number]                      Room:  Gender:     Female                       Technician: [id number]  :        [id number]                   Requested By:ER TRIAGE PROTOCOL  Order #:    [id number]                    Reading MD: JANEL MEJIA MD    Measurements  Intervals                                Axis  Rate:       85                           P:          116  SD:         161                          QRS:        -54  QRSD:       108                          T:          118  QT:         374  QTc:        445    Interpretive Statements  Sinus rhythm  Left axis deviation  Low voltage, precordial leads  RSR' in V1 or V2, probably normal variant  Borderline repolarization abnormality  Lead(s) aVR were not used for morphology analysis  No previous ECG available for comparison  Electronically Signed On 2024 09:34:05 PST by JANEL MEJIA MD     D-DIMER    Collection Time: 24  8:14 AM   Result Value Ref Range    D-Dimer <0.27 0.00 - 0.50 ug/mL (FEU)   CBC WITH DIFFERENTIAL    Collection Time: 24  8:14 AM   Result Value Ref Range    WBC 10.1 4.8 - 10.8 K/uL    RBC 5.74 (H) 4.20 - 5.40 M/uL    Hemoglobin 15.5 12.0 - 16.0 g/dL    Hematocrit 45.4 37.0 - 47.0 %    MCV 79.1 (L) 81.4 - 97.8 fL    MCH 27.0 27.0 - 33.0 pg    MCHC 34.1 32.2 - 35.5 g/dL    RDW 40.9 35.9 - 50.0 fL    Platelet Count 295 164 - 446 K/uL    MPV 9.2 9.0 - 12.9 fL    Neutrophils-Polys 84.60 (H) 44.00 - 72.00 %    Lymphocytes 10.40 (L) 22.00 - 41.00 %    Monocytes 3.70 0.00 - 13.40 %    Eosinophils 0.30 0.00 - 6.90 %    Basophils 0.30 0.00 - 1.80 %    Immature Granulocytes 0.70 0.00 - 0.90 %    Nucleated RBC 0.00 0.00 - 0.20 /100 WBC    Neutrophils (Absolute) 8.50 (H) 1.82 - 7.42 K/uL    Lymphs (Absolute) 1.05 1.00 - 4.80 K/uL    Monos (Absolute) 0.37 0.00 - 0.85 K/uL    Eos (Absolute) 0.03 0.00 - 0.51 K/uL    Baso (Absolute) 0.03 0.00 - 0.12 K/uL    Immature Granulocytes (abs) 0.07 0.00 - 0.11 K/uL    NRBC  (Absolute) 0.00 K/uL   Comp Metabolic Panel    Collection Time: 11/21/24  8:14 AM   Result Value Ref Range    Sodium 137 135 - 145 mmol/L    Potassium 4.1 3.6 - 5.5 mmol/L    Chloride 104 96 - 112 mmol/L    Co2 21 20 - 33 mmol/L    Anion Gap 12.0 7.0 - 16.0    Glucose 104 (H) 65 - 99 mg/dL    Bun 12 8 - 22 mg/dL    Creatinine 0.71 0.50 - 1.40 mg/dL    Calcium 10.0 8.5 - 10.5 mg/dL    Correct Calcium 9.4 8.5 - 10.5 mg/dL    AST(SGOT) 24 12 - 45 U/L    ALT(SGPT) 22 2 - 50 U/L    Alkaline Phosphatase 107 (H) 30 - 99 U/L    Total Bilirubin 0.3 0.1 - 1.5 mg/dL    Albumin 4.7 3.2 - 4.9 g/dL    Total Protein 8.6 (H) 6.0 - 8.2 g/dL    Globulin 3.9 (H) 1.9 - 3.5 g/dL    A-G Ratio 1.2 g/dL   TROPONIN    Collection Time: 11/21/24  8:14 AM   Result Value Ref Range    Troponin T <6 6 - 19 ng/L   BETA-HCG QUALITATIVE SERUM    Collection Time: 11/21/24  8:14 AM   Result Value Ref Range    Beta-Hcg Qualitative Serum Negative Negative   ESTIMATED GFR    Collection Time: 11/21/24  8:14 AM   Result Value Ref Range    GFR (CKD-EPI) 117 >60 mL/min/1.73 m 2   POC CoV-2, FLU A/B, RSV by PCR    Collection Time: 11/21/24  8:47 AM   Result Value Ref Range    POC Influenza A RNA, PCR Negative Negative    POC Influenza B RNA, PCR Negative Negative    POC RSV, by PCR Negative Negative    POC SARS-CoV-2, PCR NotDetected NotDetected       I have independently interpreted this EKG    RADIOLOGY/PROCEDURES   I have independently interpreted the diagnostic imaging associated with this visit and am waiting the final reading from the radiologist.   My preliminary interpretation is as follows: cxr no infiltrate or pleural effusion    Radiologist interpretation:  DX-CHEST-PORTABLE (1 VIEW)   Final Result      No acute cardiopulmonary disease evident.          COURSE & MEDICAL DECISION MAKING    ASSESSMENT, COURSE AND PLAN  Care Narrative: Araceli Vale is a 30 y.o. female who presents complaining of pain in her left back and shoulder muscles that radiates  into the front of her shoulder and chest.  She says this has happened twice.  She also states for the last week she has had some shortness of breath and coughing.  She thinks she might have a cold.  She states that when she has the pain in her shoulder makes it worse for her to breathe.  She denies any swelling to the joint.  She denies any fevers or chills and states that her cough is nonproductive.  She denies any smoking drinking or drug use.  She does not think she is pregnant.  She denies any loss of  strength or swelling to the arm.  She is not asthmatic.  On physical exam the patient is alert awake in no acute distress she speaking full sentences without any respiratory distress heart is rate rhythm no murmurs was gallops lungs are clear to auscultation bilaterally abdomen is soft and nontender she has no extremity swelling.    CHEST PAIN:   HEART Score for Major Cardiac Events  HEART Score     History: Slightly suspicious  ECG: Normal  Age: <45  Risk Factors: No known risk factors  Troponin: Less than or equal to normal limit    Heart Score: 0    Total Score   0-3 Points = Low Score, risk of MACE 0.9-1.7%.  4-6 Points = Moderate Score, risk of MACE 12-16.6%  7-10 Points = High Score, risk of MACE 50-65%          ADDITIONAL PROBLEMS MANAGED      DISPOSITION AND DISCUSSIONS    Patient's white count is 10.1 hemoglobin 15.5 plate count 295 with 84% neutrophils comprehensive metabolic panel is a glucose of 104 electrolytes are normal kidney function is normal and liver function tests are normal.  Troponin is less than 6.  D-dimer is less than 0.27.  Pregnancy test is negative.  COVID flu RSV is negative.  Patient's EKG shows normal sinus rhythm with RSR prime which is a normal variant but no ST changes.  Chest x-ray shows no infiltrate pneumothorax or pleural effusion.    Patient's D-dimer is negative and her heart score is 0.  Her chest x-ray does not show pneumonia or any other abnormalities.  I reassured  her of her negative workup.  I will place her on a course of steroids to see if it helps with her pain and symptoms.  The patient states that she does not have a primary care physician or insurance so I referred her to the [facility] for follow-up and care.  She should return for any worsening pain shortness of breath fevers or worsening symptoms.      I have discussed management of the patient with the following physicians and KENDY's: None    Discussion of management with other hospitals or appropriate source(s): None     Escalation of care considered, and ultimately not performed:none    Barriers to care at this time, including but not limited to: Patient does not have established PCP.     Decision tools and prescription drugs considered including, but not limited to: Medication modification Medrol Dosepak .      The patient will return for new or worsening symptoms and is stable at the time of discharge.    The patient is referred to a primary physician for blood pressure management, diabetic screening, and for all other preventative health concerns.        DISPOSITION:  Patient will be discharged home in stable condition.    FOLLOW UP:  [address] [address]  [phone]  Call in 2 days  to establish care      OUTPATIENT MEDICATIONS:  Discharge Medication List as of 11/21/2024 10:07 AM        START taking these medications    Details   methylPREDNISolone (MEDROL DOSEPAK) 4 MG Tablet Therapy Pack Take as directed, Disp-21 Each, R-0, Normal             FINAL DIAGNOSIS  1. Acute pain of left shoulder    2. Upper respiratory tract infection, unspecified type         Electronically signed by: Sonia Wallace M.D., 11/21/2024 7:57 AM

## 2025-01-21 ENCOUNTER — PATIENT MESSAGE (OUTPATIENT)
Dept: FAMILY MEDICINE CLINIC | Facility: CLINIC | Age: 45
End: 2025-01-21

## 2025-01-21 DIAGNOSIS — Z12.31 ENCOUNTER FOR SCREENING MAMMOGRAM FOR MALIGNANT NEOPLASM OF BREAST: Primary | ICD-10-CM

## 2025-01-21 NOTE — TELEPHONE ENCOUNTER
Screening mammogram order placed per protocol.         Future Appointments   Date Time Provider Department Center   2/5/2025  6:00 PM [facility]

## 2025-02-05 ENCOUNTER — OFFICE VISIT (OUTPATIENT)
Dept: FAMILY MEDICINE CLINIC | Facility: CLINIC | Age: 45
End: 2025-02-05
Payer: COMMERCIAL

## 2025-02-05 VITALS
TEMPERATURE: 98 F | BODY MASS INDEX: 29.16 KG/M2 | HEIGHT: 65 IN | SYSTOLIC BLOOD PRESSURE: 116 MMHG | DIASTOLIC BLOOD PRESSURE: 76 MMHG | WEIGHT: 175 LBS | HEART RATE: 57 BPM

## 2025-02-05 DIAGNOSIS — R14.0 ABDOMINAL BLOATING: Primary | ICD-10-CM

## 2025-02-05 DIAGNOSIS — R12 HEARTBURN: ICD-10-CM

## 2025-02-05 PROCEDURE — 3078F DIAST BP <80 MM HG: CPT | Performed by: FAMILY MEDICINE

## 2025-02-05 PROCEDURE — 99214 OFFICE O/P EST MOD 30 MIN: CPT | Performed by: FAMILY MEDICINE

## 2025-02-05 PROCEDURE — 3074F SYST BP LT 130 MM HG: CPT | Performed by: FAMILY MEDICINE

## 2025-02-05 PROCEDURE — 3008F BODY MASS INDEX DOCD: CPT | Performed by: FAMILY MEDICINE

## 2025-02-06 NOTE — PROGRESS NOTES
HPI:    Patient ID: Martinez Fernandez is a 44 year old female.      HPI    Chief Complaint   Patient presents with    Acid Base Problem     Started this past month discontinued spicy food and took omeprazole for 14 days wants to know if she should continue omeprazole also gets stomach bloating when eating        Wt Readings from Last 6 Encounters:   02/05/25 175 lb (79.4 kg)   11/13/24 174 lb (78.9 kg)   10/23/24 173 lb (78.5 kg)   05/23/24 168 lb (76.2 kg)   04/17/24 169 lb (76.7 kg)   02/09/24 171 lb (77.6 kg)     BP Readings from Last 3 Encounters:   02/05/25 116/76   11/13/24 128/77   10/23/24 119/75     Had had heartburns recently  Has been eating more spicy foods. Hot taco sauce.  Was eating late but changed diet  Eating early.  Cut out spicy foods.    Took  omeprazole for 14 days.  Symptoms better of heart burn  Feels bloated.    Working out.      Review of Systems   Constitutional:  Negative for chills and fever.   Gastrointestinal:  Negative for abdominal distention, abdominal pain, anal bleeding, blood in stool, constipation, diarrhea, nausea, rectal pain and vomiting.       /76   Pulse 57   Temp 97.6 °F (36.4 °C) (Temporal)   Ht 5' 5\" (1.651 m)   Wt 175 lb (79.4 kg)   LMP 01/11/2025 (Exact Date)   BMI 29.12 kg/m²          Current Outpatient Medications   Medication Sig Dispense Refill    Multiple Vitamin (MULTIVITAMIN ADULT) Oral Tab Take 1 tablet by mouth daily. With iron 1 tablet 0    Cholecalciferol (VITAMIN D) 1000 units Oral Tab Take by mouth.      cetirizine 10 MG Oral Tab Take 1 tablet (10 mg total) by mouth daily. (Patient not taking: Reported on 2/5/2025) 30 tablet 1     Allergies:Allergies[1]   PHYSICAL EXAM:     Chief Complaint   Patient presents with    Acid Base Problem     Started this past month discontinued spicy food and took omeprazole for 14 days wants to know if she should continue omeprazole also gets stomach bloating when eating       Physical Exam  Vitals reviewed.    Abdominal:      General: Bowel sounds are normal. There is no distension.      Palpations: Abdomen is soft. There is no mass.      Tenderness: There is no abdominal tenderness. There is no right CVA tenderness, left CVA tenderness, guarding or rebound.      Hernia: No hernia is present.   Neurological:      Mental Status: She is alert.                ASSESSMENT/PLAN:     Encounter Diagnoses   Name Primary?    Abdominal bloating Yes    Heartburn        1. Abdominal bloating  Better  Avoid spicy/ greasy foods   - [facility] - INTERNAL    2. Heartburn  [place] diet   Avoid caffeine/ nsaids/ late eating   - [facility] - INTERNAL  - Helicobacter Pylori Breath Test, Adult; Future      Orders Placed This Encounter   Procedures    Helicobacter Pylori Breath Test, Adult         The above note was creating using Dragon speech recognition technology. Please excuse any typos    Meds This Visit:  Requested Prescriptions      No prescriptions requested or ordered in this encounter       Imaging & Referrals:  [facility] - INTERNAL       ID#[id number]       [1] No Known Allergies

## 2025-02-08 ENCOUNTER — LAB ENCOUNTER (OUTPATIENT)
Dept: LAB | Age: 45
End: 2025-02-08
Attending: FAMILY MEDICINE
Payer: COMMERCIAL

## 2025-02-08 DIAGNOSIS — R12 HEARTBURN: ICD-10-CM

## 2025-02-08 PROCEDURE — 83013 H PYLORI (C-13) BREATH: CPT

## 2025-02-11 LAB — H PYLORI BREATH TEST: NEGATIVE

## 2025-03-20 ENCOUNTER — PATIENT MESSAGE (OUTPATIENT)
Dept: FAMILY MEDICINE CLINIC | Facility: CLINIC | Age: 45
End: 2025-03-20

## 2025-04-19 ENCOUNTER — HOSPITAL ENCOUNTER (OUTPATIENT)
Dept: MAMMOGRAPHY | Age: 45
Discharge: HOME | End: 2025-04-19
Attending: FAMILY MEDICINE
Payer: COMMERCIAL

## 2025-04-19 DIAGNOSIS — Z12.31 ENCOUNTER FOR SCREENING MAMMOGRAM FOR MALIGNANT NEOPLASM OF BREAST: ICD-10-CM

## 2025-04-19 PROCEDURE — 77067 SCR MAMMO BI INCL CAD: CPT | Performed by: FAMILY MEDICINE

## 2025-04-19 PROCEDURE — 77063 BREAST TOMOSYNTHESIS BI: CPT | Performed by: FAMILY MEDICINE

## 2025-05-14 ENCOUNTER — PATIENT MESSAGE (OUTPATIENT)
Dept: FAMILY MEDICINE CLINIC | Facility: CLINIC | Age: 45
End: 2025-05-14

## 2025-05-14 DIAGNOSIS — R12 HEARTBURN: ICD-10-CM

## 2025-05-14 DIAGNOSIS — R14.0 ABDOMINAL BLOATING: Primary | ICD-10-CM

## 2025-05-15 NOTE — TELEPHONE ENCOUNTER
Gastroenterology referral was entered by Dr. Akbar on 2/5/25 to [facility].     New referral entered per written order to [facility] provider, at patient's request.

## 2025-05-29 ENCOUNTER — PATIENT MESSAGE (OUTPATIENT)
Dept: FAMILY MEDICINE CLINIC | Facility: CLINIC | Age: 45
End: 2025-05-29

## 2025-05-30 NOTE — TELEPHONE ENCOUNTER
Managed care, please review and assist. Thanks.    See also other mychart message.     HEMANTH Welch

## 2025-08-06 ENCOUNTER — PATIENT MESSAGE (OUTPATIENT)
Dept: FAMILY MEDICINE CLINIC | Facility: CLINIC | Age: 45
End: 2025-08-06

## 2025-08-11 ENCOUNTER — PATIENT MESSAGE (OUTPATIENT)
Dept: FAMILY MEDICINE CLINIC | Facility: CLINIC | Age: 45
End: 2025-08-11

## 2025-08-11 ENCOUNTER — NURSE TRIAGE (OUTPATIENT)
Dept: FAMILY MEDICINE CLINIC | Facility: CLINIC | Age: 45
End: 2025-08-11

## 2025-08-11 ENCOUNTER — OFFICE VISIT (OUTPATIENT)
Dept: DERMATOLOGY CLINIC | Facility: CLINIC | Age: 45
End: 2025-08-11

## 2025-08-11 DIAGNOSIS — L30.9 DERMATITIS: Primary | ICD-10-CM

## 2025-08-11 DIAGNOSIS — L30.9 HAND DERMATITIS: Primary | ICD-10-CM

## 2025-08-11 PROCEDURE — 99203 OFFICE O/P NEW LOW 30 MIN: CPT | Performed by: PHYSICIAN ASSISTANT

## 2025-08-11 RX ORDER — CETIRIZINE HYDROCHLORIDE 10 MG/1
10 TABLET ORAL DAILY
Qty: 30 TABLET | Refills: 1 | Status: SHIPPED | OUTPATIENT
Start: 2025-08-11

## 2025-08-11 RX ORDER — CLOBETASOL PROPIONATE 0.5 MG/G
1 CREAM TOPICAL 2 TIMES DAILY
Qty: 60 G | Refills: 1 | Status: SHIPPED | OUTPATIENT
Start: 2025-08-11 | End: 2026-08-11

## 2025-08-18 ENCOUNTER — PATIENT MESSAGE (OUTPATIENT)
Dept: FAMILY MEDICINE CLINIC | Facility: CLINIC | Age: 45
End: 2025-08-18

## 2025-08-20 ENCOUNTER — PATIENT MESSAGE (OUTPATIENT)
Dept: DERMATOLOGY CLINIC | Facility: CLINIC | Age: 45
End: 2025-08-20

## 2025-08-22 ENCOUNTER — TELEPHONE (OUTPATIENT)
Dept: CASE MANAGEMENT | Age: 45
End: 2025-08-22

## 2025-08-22 DIAGNOSIS — M54.2 NECK PAIN: Primary | ICD-10-CM

## 2025-08-25 ENCOUNTER — LAB ENCOUNTER (OUTPATIENT)
Dept: LAB | Age: 45
End: 2025-08-25
Attending: PHYSICIAN ASSISTANT

## 2025-08-25 DIAGNOSIS — L30.9 HAND DERMATITIS: ICD-10-CM

## 2025-08-25 PROCEDURE — 36415 COLL VENOUS BLD VENIPUNCTURE: CPT

## 2025-08-25 PROCEDURE — 86003 ALLG SPEC IGE CRUDE XTRC EA: CPT

## 2025-08-25 PROCEDURE — 82785 ASSAY OF IGE: CPT

## 2025-08-26 LAB
A ALTERNATA IGE QN: <0.1 KUA/L (ref ?–0.1)
C HERBARUM IGE QN: <0.1 KUA/L (ref ?–0.1)
CAT DANDER IGE QN: <0.1 KUA/L (ref ?–0.1)
COMMON RAGWEED IGE QN: <0.1 KUA/L (ref ?–0.1)
D FARINAE IGE QN: <0.1 KUA/L (ref ?–0.1)
DOG DANDER IGE QN: <0.1 KUA/L (ref ?–0.1)
GOOSEFOOT IGE QN: <0.1 KUA/L (ref ?–0.1)
HOUSE DUST HS IGE QN: <0.1 KUA/L (ref ?–0.1)
IGE SERPL-ACNC: 111 KU/L (ref 2–214)
KENT BLUE GRASS IGE QN: <0.1 KUA/L (ref ?–0.1)
PER RYE GRASS IGE QN: <0.1 KUA/L (ref ?–0.1)
WHITE ELM IGE QN: <0.1 KUA/L (ref ?–0.1)
WHITE OAK IGE QN: <0.1 KUA/L (ref ?–0.1)

## 2025-08-27 LAB
ALLERGEN BRAZIL NUT: <0.1 KUA/L (ref ?–0.1)
ALMOND IGE QN: <0.1 KUA/L (ref ?–0.1)
CASHEW NUT IGE QN: <0.1 KUA/L (ref ?–0.1)
CLAM IGE QN: <0.1 KUA/L (ref ?–0.1)
CODFISH IGE QN: <0.1 KUA/L (ref ?–0.1)
CORN IGE QN: <0.1 KUA/L (ref ?–0.1)
COW MILK IGE QN: <0.1 KUA/L (ref ?–0.1)
EGG WHITE IGE QN: <0.1 KUA/L (ref ?–0.1)
GLUTEN IGE QN: <0.1 KUA/L (ref ?–0.1)
HAZELNUT IGE QN: <0.1 KUA/L (ref ?–0.1)
IGE SERPL-ACNC: 109 KU/L (ref 2–214)
PEANUT IGE QN: <0.1 KUA/L (ref ?–0.1)
SALMON IGE QN: <0.1 KUA/L (ref ?–0.1)
SCALLOP IGE QN: <0.1 KUA/L (ref ?–0.1)
SESAME SEED IGE QN: <0.1 KUA/L (ref ?–0.1)
SHRIMP IGE QN: <0.1 KUA/L (ref ?–0.1)
SOYBEAN IGE QN: <0.1 KUA/L (ref ?–0.1)
WALNUT IGE QN: <0.1 KUA/L (ref ?–0.1)
WHEAT IGE QN: <0.1 KUA/L (ref ?–0.1)

## 2025-08-28 ENCOUNTER — PATIENT MESSAGE (OUTPATIENT)
Dept: DERMATOLOGY CLINIC | Facility: CLINIC | Age: 45
End: 2025-08-28

## (undated) NOTE — MR AVS SNAPSHOT
After Visit Summary   12/21/2019    Charisma Contreras    MRN: [id number]           Visit Information     Date & Time  12/21/2019  9:50 AM Provider  Ashia Callejas MD [address] [place] [address], [address] [place] [address],[address], [facility]/[employer]  Phone  3 drinks, candies and desserts   Eat plenty of low-fat dairy products High fat meats and dairy   Choose whole grain products Foods high in sodium   Water is best for hydration Fast food.    Eat at home when possible     Tips for increasing your physical activ $73*       VIDEO VISITS  Visit face-to-face with a [facility] physician or [facility]  using your mobile device or computer   using [employer]      e-VISITS  Communicate with a [facility] physician or [facility]  online.   The physician will respond and provide a treatment plan within a

## (undated) NOTE — LETTER
12/27/2019              [address]         Dear Milka Fraga,    It was a pleasure to see you.  Your pap, and HPV was neg, repeat pap needed in 3 years, return to clinic 1 year for annual exam. The